# Patient Record
Sex: FEMALE | Race: WHITE | NOT HISPANIC OR LATINO | Employment: FULL TIME | URBAN - METROPOLITAN AREA
[De-identification: names, ages, dates, MRNs, and addresses within clinical notes are randomized per-mention and may not be internally consistent; named-entity substitution may affect disease eponyms.]

---
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
## 2021-02-09 ENCOUNTER — CLINICAL SUPPORT (OUTPATIENT)
Dept: URGENT CARE | Facility: CLINIC | Age: 21
End: 2021-02-09
Payer: COMMERCIAL

## 2021-02-09 DIAGNOSIS — Z11.9 ENCOUNTER FOR SCREENING EXAMINATION FOR INFECTIOUS DISEASE: Primary | ICD-10-CM

## 2021-02-09 LAB
CTP QC/QA: YES
SARS-COV-2 RDRP RESP QL NAA+PROBE: NEGATIVE

## 2021-02-09 PROCEDURE — U0002: ICD-10-PCS | Mod: QW,S$GLB,, | Performed by: INTERNAL MEDICINE

## 2021-02-09 PROCEDURE — U0002 COVID-19 LAB TEST NON-CDC: HCPCS | Mod: QW,S$GLB,, | Performed by: INTERNAL MEDICINE

## 2021-02-14 ENCOUNTER — CLINICAL SUPPORT (OUTPATIENT)
Dept: URGENT CARE | Facility: CLINIC | Age: 21
End: 2021-02-14
Payer: COMMERCIAL

## 2021-02-14 DIAGNOSIS — Z20.822 CLOSE EXPOSURE TO COVID-19 VIRUS: Primary | ICD-10-CM

## 2021-02-14 LAB
CTP QC/QA: YES
SARS-COV-2 RDRP RESP QL NAA+PROBE: NEGATIVE

## 2021-02-14 PROCEDURE — U0002: ICD-10-PCS | Mod: QW,S$GLB,, | Performed by: INTERNAL MEDICINE

## 2021-02-14 PROCEDURE — U0002 COVID-19 LAB TEST NON-CDC: HCPCS | Mod: QW,S$GLB,, | Performed by: INTERNAL MEDICINE

## 2021-03-02 ENCOUNTER — CLINICAL SUPPORT (OUTPATIENT)
Dept: URGENT CARE | Facility: CLINIC | Age: 21
End: 2021-03-02
Payer: COMMERCIAL

## 2021-03-02 DIAGNOSIS — U07.1 COVID-19 VIRUS DETECTED: ICD-10-CM

## 2021-03-02 DIAGNOSIS — Z11.9 ENCOUNTER FOR SCREENING EXAMINATION FOR INFECTIOUS DISEASE: Primary | ICD-10-CM

## 2021-03-02 LAB
CTP QC/QA: YES
SARS-COV-2 RDRP RESP QL NAA+PROBE: POSITIVE

## 2021-03-02 PROCEDURE — U0002: ICD-10-PCS | Mod: QW,S$GLB,, | Performed by: PHYSICIAN ASSISTANT

## 2021-03-02 PROCEDURE — U0002 COVID-19 LAB TEST NON-CDC: HCPCS | Mod: QW,S$GLB,, | Performed by: PHYSICIAN ASSISTANT

## 2021-04-15 ENCOUNTER — PATIENT MESSAGE (OUTPATIENT)
Dept: RESEARCH | Facility: HOSPITAL | Age: 21
End: 2021-04-15

## 2021-06-03 ENCOUNTER — IMMUNIZATION (OUTPATIENT)
Dept: PRIMARY CARE CLINIC | Facility: CLINIC | Age: 21
End: 2021-06-03
Payer: COMMERCIAL

## 2021-06-03 DIAGNOSIS — Z23 NEED FOR VACCINATION: Primary | ICD-10-CM

## 2021-06-03 PROCEDURE — 0001A COVID-19, MRNA, LNP-S, PF, 30 MCG/0.3 ML DOSE VACCINE: CPT | Mod: CV19,S$GLB,, | Performed by: INTERNAL MEDICINE

## 2021-06-03 PROCEDURE — 91300 COVID-19, MRNA, LNP-S, PF, 30 MCG/0.3 ML DOSE VACCINE: CPT | Mod: S$GLB,,, | Performed by: INTERNAL MEDICINE

## 2021-06-03 PROCEDURE — 0001A COVID-19, MRNA, LNP-S, PF, 30 MCG/0.3 ML DOSE VACCINE: ICD-10-PCS | Mod: CV19,S$GLB,, | Performed by: INTERNAL MEDICINE

## 2021-06-03 PROCEDURE — 91300 COVID-19, MRNA, LNP-S, PF, 30 MCG/0.3 ML DOSE VACCINE: ICD-10-PCS | Mod: S$GLB,,, | Performed by: INTERNAL MEDICINE

## 2021-06-18 RX ORDER — NORGESTIMATE AND ETHINYL ESTRADIOL 7DAYSX3 LO
1 KIT ORAL
COMMUNITY
Start: 2021-01-05 | End: 2023-03-30

## 2021-06-22 ENCOUNTER — OFFICE VISIT (OUTPATIENT)
Dept: FAMILY MEDICINE | Facility: CLINIC | Age: 21
End: 2021-06-22
Payer: COMMERCIAL

## 2021-06-22 VITALS
BODY MASS INDEX: 23.71 KG/M2 | SYSTOLIC BLOOD PRESSURE: 120 MMHG | HEART RATE: 68 BPM | TEMPERATURE: 98 F | HEIGHT: 64 IN | OXYGEN SATURATION: 99 % | DIASTOLIC BLOOD PRESSURE: 70 MMHG | WEIGHT: 138.88 LBS

## 2021-06-22 DIAGNOSIS — Z23 NEED FOR MENINGOCOCCAL VACCINATION: ICD-10-CM

## 2021-06-22 DIAGNOSIS — Z02.0 SCHOOL PHYSICAL EXAM: ICD-10-CM

## 2021-06-22 DIAGNOSIS — Z00.00 ANNUAL PHYSICAL EXAM: Primary | ICD-10-CM

## 2021-06-22 PROCEDURE — 3008F BODY MASS INDEX DOCD: CPT | Mod: CPTII,S$GLB,, | Performed by: FAMILY MEDICINE

## 2021-06-22 PROCEDURE — 99385 PR PREVENTIVE VISIT,NEW,18-39: ICD-10-PCS | Mod: S$GLB,,, | Performed by: FAMILY MEDICINE

## 2021-06-22 PROCEDURE — 99385 PREV VISIT NEW AGE 18-39: CPT | Mod: S$GLB,,, | Performed by: FAMILY MEDICINE

## 2021-06-22 PROCEDURE — 3008F PR BODY MASS INDEX (BMI) DOCUMENTED: ICD-10-PCS | Mod: CPTII,S$GLB,, | Performed by: FAMILY MEDICINE

## 2021-06-22 PROCEDURE — 86580 POCT TB SKIN TEST: ICD-10-PCS | Mod: S$GLB,,, | Performed by: FAMILY MEDICINE

## 2021-06-22 PROCEDURE — 1126F AMNT PAIN NOTED NONE PRSNT: CPT | Mod: S$GLB,,, | Performed by: FAMILY MEDICINE

## 2021-06-22 PROCEDURE — 99999 PR PBB SHADOW E&M-EST. PATIENT-LVL III: CPT | Mod: PBBFAC,,, | Performed by: FAMILY MEDICINE

## 2021-06-22 PROCEDURE — 99999 PR PBB SHADOW E&M-EST. PATIENT-LVL III: ICD-10-PCS | Mod: PBBFAC,,, | Performed by: FAMILY MEDICINE

## 2021-06-22 PROCEDURE — 86580 TB INTRADERMAL TEST: CPT | Mod: S$GLB,,, | Performed by: FAMILY MEDICINE

## 2021-06-22 PROCEDURE — 1126F PR PAIN SEVERITY QUANTIFIED, NO PAIN PRESENT: ICD-10-PCS | Mod: S$GLB,,, | Performed by: FAMILY MEDICINE

## 2021-06-24 ENCOUNTER — IMMUNIZATION (OUTPATIENT)
Dept: PRIMARY CARE CLINIC | Facility: CLINIC | Age: 21
End: 2021-06-24

## 2021-06-24 DIAGNOSIS — Z23 NEED FOR VACCINATION: Primary | ICD-10-CM

## 2021-06-24 PROCEDURE — 91300 COVID-19, MRNA, LNP-S, PF, 30 MCG/0.3 ML DOSE VACCINE: CPT | Mod: S$GLB,,, | Performed by: INTERNAL MEDICINE

## 2021-06-24 PROCEDURE — 0002A COVID-19, MRNA, LNP-S, PF, 30 MCG/0.3 ML DOSE VACCINE: CPT | Mod: CV19,S$GLB,, | Performed by: INTERNAL MEDICINE

## 2021-06-24 PROCEDURE — 0002A COVID-19, MRNA, LNP-S, PF, 30 MCG/0.3 ML DOSE VACCINE: ICD-10-PCS | Mod: CV19,S$GLB,, | Performed by: INTERNAL MEDICINE

## 2021-06-24 PROCEDURE — 91300 COVID-19, MRNA, LNP-S, PF, 30 MCG/0.3 ML DOSE VACCINE: ICD-10-PCS | Mod: S$GLB,,, | Performed by: INTERNAL MEDICINE

## 2021-06-25 ENCOUNTER — CLINICAL SUPPORT (OUTPATIENT)
Dept: FAMILY MEDICINE | Facility: CLINIC | Age: 21
End: 2021-06-25
Payer: COMMERCIAL

## 2021-06-25 ENCOUNTER — LAB VISIT (OUTPATIENT)
Dept: LAB | Facility: HOSPITAL | Age: 21
End: 2021-06-25
Attending: FAMILY MEDICINE
Payer: COMMERCIAL

## 2021-06-25 DIAGNOSIS — Z00.00 ANNUAL PHYSICAL EXAM: ICD-10-CM

## 2021-06-25 DIAGNOSIS — Z02.0 SCHOOL PHYSICAL EXAM: Primary | ICD-10-CM

## 2021-06-25 DIAGNOSIS — Z11.1 SCREENING-PULMONARY TB: Primary | ICD-10-CM

## 2021-06-25 DIAGNOSIS — Z02.0 SCHOOL PHYSICAL EXAM: ICD-10-CM

## 2021-06-25 LAB
ALBUMIN SERPL BCP-MCNC: 4.1 G/DL (ref 3.5–5.2)
ALP SERPL-CCNC: 46 U/L (ref 55–135)
ALT SERPL W/O P-5'-P-CCNC: 14 U/L (ref 10–44)
ANION GAP SERPL CALC-SCNC: 11 MMOL/L (ref 8–16)
AST SERPL-CCNC: 15 U/L (ref 10–40)
BASOPHILS # BLD AUTO: 0.04 K/UL (ref 0–0.2)
BASOPHILS NFR BLD: 0.4 % (ref 0–1.9)
BILIRUB SERPL-MCNC: 1.1 MG/DL (ref 0.1–1)
BUN SERPL-MCNC: 10 MG/DL (ref 6–20)
CALCIUM SERPL-MCNC: 9.6 MG/DL (ref 8.7–10.5)
CHLORIDE SERPL-SCNC: 104 MMOL/L (ref 95–110)
CHOLEST SERPL-MCNC: 145 MG/DL (ref 120–199)
CHOLEST/HDLC SERPL: 3 {RATIO} (ref 2–5)
CO2 SERPL-SCNC: 22 MMOL/L (ref 23–29)
CREAT SERPL-MCNC: 0.8 MG/DL (ref 0.5–1.4)
DIFFERENTIAL METHOD: ABNORMAL
EOSINOPHIL # BLD AUTO: 0.3 K/UL (ref 0–0.5)
EOSINOPHIL NFR BLD: 3.4 % (ref 0–8)
ERYTHROCYTE [DISTWIDTH] IN BLOOD BY AUTOMATED COUNT: 17 % (ref 11.5–14.5)
EST. GFR  (AFRICAN AMERICAN): >60 ML/MIN/1.73 M^2
EST. GFR  (NON AFRICAN AMERICAN): >60 ML/MIN/1.73 M^2
ESTIMATED AVG GLUCOSE: 82 MG/DL (ref 68–131)
GLUCOSE SERPL-MCNC: 76 MG/DL (ref 70–110)
HBA1C MFR BLD: 4.5 % (ref 4–5.6)
HCT VFR BLD AUTO: 33 % (ref 37–48.5)
HDLC SERPL-MCNC: 48 MG/DL (ref 40–75)
HDLC SERPL: 33.1 % (ref 20–50)
HGB BLD-MCNC: 9.9 G/DL (ref 12–16)
IMM GRANULOCYTES # BLD AUTO: 0.03 K/UL (ref 0–0.04)
IMM GRANULOCYTES NFR BLD AUTO: 0.3 % (ref 0–0.5)
LDLC SERPL CALC-MCNC: 76 MG/DL (ref 63–159)
LYMPHOCYTES # BLD AUTO: 1.2 K/UL (ref 1–4.8)
LYMPHOCYTES NFR BLD: 12.9 % (ref 18–48)
MCH RBC QN AUTO: 18.5 PG (ref 27–31)
MCHC RBC AUTO-ENTMCNC: 30 G/DL (ref 32–36)
MCV RBC AUTO: 62 FL (ref 82–98)
MONOCYTES # BLD AUTO: 0.5 K/UL (ref 0.3–1)
MONOCYTES NFR BLD: 5 % (ref 4–15)
NEUTROPHILS # BLD AUTO: 7.4 K/UL (ref 1.8–7.7)
NEUTROPHILS NFR BLD: 78 % (ref 38–73)
NONHDLC SERPL-MCNC: 97 MG/DL
NRBC BLD-RTO: 0 /100 WBC
PLATELET # BLD AUTO: 230 K/UL (ref 150–450)
PMV BLD AUTO: ABNORMAL FL (ref 9.2–12.9)
POTASSIUM SERPL-SCNC: 3.7 MMOL/L (ref 3.5–5.1)
PROT SERPL-MCNC: 7.4 G/DL (ref 6–8.4)
RBC # BLD AUTO: 5.34 M/UL (ref 4–5.4)
SODIUM SERPL-SCNC: 137 MMOL/L (ref 136–145)
TB INDURATION - 48 HR READ: 0 MM
TB INDURATION - 72 HR READ: NORMAL
TB SKIN TEST - 48 HR READ: NEGATIVE
TB SKIN TEST - 72 HR READ: NORMAL
TRIGL SERPL-MCNC: 105 MG/DL (ref 30–150)
TSH SERPL DL<=0.005 MIU/L-ACNC: 0.96 UIU/ML (ref 0.4–4)
WBC # BLD AUTO: 9.43 K/UL (ref 3.9–12.7)

## 2021-06-25 PROCEDURE — 85025 COMPLETE CBC W/AUTO DIFF WBC: CPT | Performed by: FAMILY MEDICINE

## 2021-06-25 PROCEDURE — 86706 HEP B SURFACE ANTIBODY: CPT | Performed by: FAMILY MEDICINE

## 2021-06-25 PROCEDURE — 86735 MUMPS ANTIBODY: CPT | Performed by: FAMILY MEDICINE

## 2021-06-25 PROCEDURE — 86762 RUBELLA ANTIBODY: CPT | Performed by: FAMILY MEDICINE

## 2021-06-25 PROCEDURE — 36415 COLL VENOUS BLD VENIPUNCTURE: CPT | Mod: PO | Performed by: FAMILY MEDICINE

## 2021-06-25 PROCEDURE — 86787 VARICELLA-ZOSTER ANTIBODY: CPT | Performed by: FAMILY MEDICINE

## 2021-06-25 PROCEDURE — 80053 COMPREHEN METABOLIC PANEL: CPT | Performed by: FAMILY MEDICINE

## 2021-06-25 PROCEDURE — 84443 ASSAY THYROID STIM HORMONE: CPT | Performed by: FAMILY MEDICINE

## 2021-06-25 PROCEDURE — 86803 HEPATITIS C AB TEST: CPT | Performed by: FAMILY MEDICINE

## 2021-06-25 PROCEDURE — 86703 HIV-1/HIV-2 1 RESULT ANTBDY: CPT | Performed by: FAMILY MEDICINE

## 2021-06-25 PROCEDURE — 86765 RUBEOLA ANTIBODY: CPT | Performed by: FAMILY MEDICINE

## 2021-06-25 PROCEDURE — 83036 HEMOGLOBIN GLYCOSYLATED A1C: CPT | Performed by: FAMILY MEDICINE

## 2021-06-25 PROCEDURE — 80061 LIPID PANEL: CPT | Performed by: FAMILY MEDICINE

## 2021-06-28 LAB
HBV SURFACE AB SER QL IA: NEGATIVE
HBV SURFACE AB SERPL IA-ACNC: <3 MIU/ML
HCV AB SERPL QL IA: NEGATIVE
HIV 1+2 AB+HIV1 P24 AG SERPL QL IA: NEGATIVE
MUMPS IGG INTERPRETATION: POSITIVE
MUMPS IGG SCREEN: 1.84 ISR (ref 0–0.9)
RUBEOLA IGG ANTIBODY: 1.16 ISR (ref 0–0.9)
RUBEOLA INTERPRETATION: POSITIVE
RUBV IGG SER-ACNC: 102 IU/ML
RUBV IGG SER-IMP: REACTIVE

## 2021-06-29 ENCOUNTER — TELEPHONE (OUTPATIENT)
Dept: FAMILY MEDICINE | Facility: CLINIC | Age: 21
End: 2021-06-29

## 2021-06-30 LAB
VARICELLA INTERPRETATION: ABNORMAL
VARICELLA ZOSTER IGG: 1 ISR (ref 0–0.9)

## 2021-07-01 DIAGNOSIS — Z23 NEED FOR HEPATITIS B VACCINATION: Primary | ICD-10-CM

## 2021-07-09 ENCOUNTER — CLINICAL SUPPORT (OUTPATIENT)
Dept: FAMILY MEDICINE | Facility: CLINIC | Age: 21
End: 2021-07-09
Payer: COMMERCIAL

## 2021-07-09 DIAGNOSIS — Z11.1 SCREENING-PULMONARY TB: Primary | ICD-10-CM

## 2021-07-09 PROCEDURE — 99499 NO LOS: ICD-10-PCS | Mod: S$GLB,,, | Performed by: FAMILY MEDICINE

## 2021-07-09 PROCEDURE — 96373 PR INJECTION,THERAP/PROPH/DIAGNOST, INTRA-ARTERIAL: ICD-10-PCS | Mod: S$GLB,,, | Performed by: FAMILY MEDICINE

## 2021-07-09 PROCEDURE — 96373 THER/PROPH/DIAG INJ IA: CPT | Mod: S$GLB,,, | Performed by: FAMILY MEDICINE

## 2021-07-09 PROCEDURE — 99499 UNLISTED E&M SERVICE: CPT | Mod: S$GLB,,, | Performed by: FAMILY MEDICINE

## 2021-07-09 PROCEDURE — 86580 POCT TB SKIN TEST: ICD-10-PCS | Mod: S$GLB,,, | Performed by: FAMILY MEDICINE

## 2021-07-09 PROCEDURE — 86580 TB INTRADERMAL TEST: CPT | Mod: S$GLB,,, | Performed by: FAMILY MEDICINE

## 2021-07-12 ENCOUNTER — CLINICAL SUPPORT (OUTPATIENT)
Dept: FAMILY MEDICINE | Facility: CLINIC | Age: 21
End: 2021-07-12
Payer: COMMERCIAL

## 2021-07-12 DIAGNOSIS — Z11.1 SCREENING-PULMONARY TB: Primary | ICD-10-CM

## 2021-07-12 LAB
TB INDURATION - 48 HR READ: 0 MM
TB INDURATION - 72 HR READ: NORMAL
TB SKIN TEST - 48 HR READ: NEGATIVE
TB SKIN TEST - 72 HR READ: NORMAL

## 2021-07-12 PROCEDURE — 99499 UNLISTED E&M SERVICE: CPT | Mod: S$GLB,,, | Performed by: FAMILY MEDICINE

## 2021-07-12 PROCEDURE — 99499 NO LOS: ICD-10-PCS | Mod: S$GLB,,, | Performed by: FAMILY MEDICINE

## 2021-08-05 ENCOUNTER — CLINICAL SUPPORT (OUTPATIENT)
Dept: FAMILY MEDICINE | Facility: CLINIC | Age: 21
End: 2021-08-05
Payer: COMMERCIAL

## 2021-08-05 DIAGNOSIS — Z23 NEED FOR HEPATITIS B VACCINATION: Primary | ICD-10-CM

## 2021-08-05 DIAGNOSIS — Z23 NEED FOR DIPHTHERIA-TETANUS-PERTUSSIS (TDAP) VACCINE: ICD-10-CM

## 2021-08-05 DIAGNOSIS — Z23 NEED FOR VARICELLA VACCINE: ICD-10-CM

## 2021-08-05 PROCEDURE — 90472 IMMUNIZATION ADMIN EACH ADD: CPT | Mod: S$GLB,,, | Performed by: FAMILY MEDICINE

## 2021-08-05 PROCEDURE — 90715 TDAP VACCINE 7 YRS/> IM: CPT | Mod: S$GLB,,, | Performed by: FAMILY MEDICINE

## 2021-08-05 PROCEDURE — 90472 VARICELLA VACCINE SQ: ICD-10-PCS | Mod: S$GLB,,, | Performed by: FAMILY MEDICINE

## 2021-08-05 PROCEDURE — 90716 VAR VACCINE LIVE SUBQ: CPT | Mod: S$GLB,,, | Performed by: FAMILY MEDICINE

## 2021-08-05 PROCEDURE — 99499 NO LOS: ICD-10-PCS | Mod: S$GLB,,, | Performed by: FAMILY MEDICINE

## 2021-08-05 PROCEDURE — 90471 HEPATITIS B (RECOMBINANT) ADJUVANTED, 2 DOSE: ICD-10-PCS | Mod: S$GLB,,, | Performed by: FAMILY MEDICINE

## 2021-08-05 PROCEDURE — 90715 TDAP VACCINE GREATER THAN OR EQUAL TO 7YO IM: ICD-10-PCS | Mod: S$GLB,,, | Performed by: FAMILY MEDICINE

## 2021-08-05 PROCEDURE — 90739 HEPB VACC 2/4 DOSE ADULT IM: CPT | Mod: S$GLB,,, | Performed by: FAMILY MEDICINE

## 2021-08-05 PROCEDURE — 90739 HEPATITIS B (RECOMBINANT) ADJUVANTED, 2 DOSE: ICD-10-PCS | Mod: S$GLB,,, | Performed by: FAMILY MEDICINE

## 2021-08-05 PROCEDURE — 90471 IMMUNIZATION ADMIN: CPT | Mod: S$GLB,,, | Performed by: FAMILY MEDICINE

## 2021-08-05 PROCEDURE — 90716 VARICELLA VACCINE SQ: ICD-10-PCS | Mod: S$GLB,,, | Performed by: FAMILY MEDICINE

## 2021-08-05 PROCEDURE — 99499 UNLISTED E&M SERVICE: CPT | Mod: S$GLB,,, | Performed by: FAMILY MEDICINE

## 2021-09-21 ENCOUNTER — TELEPHONE (OUTPATIENT)
Dept: FAMILY MEDICINE | Facility: CLINIC | Age: 21
End: 2021-09-21

## 2021-09-21 DIAGNOSIS — Z02.0 SCHOOL PHYSICAL EXAM: Primary | ICD-10-CM

## 2021-09-22 ENCOUNTER — TELEPHONE (OUTPATIENT)
Dept: FAMILY MEDICINE | Facility: CLINIC | Age: 21
End: 2021-09-22

## 2021-09-23 ENCOUNTER — LAB VISIT (OUTPATIENT)
Dept: LAB | Facility: HOSPITAL | Age: 21
End: 2021-09-23
Attending: FAMILY MEDICINE
Payer: COMMERCIAL

## 2021-09-23 DIAGNOSIS — Z02.0 SCHOOL PHYSICAL EXAM: ICD-10-CM

## 2021-09-23 PROCEDURE — 86735 MUMPS ANTIBODY: CPT | Performed by: FAMILY MEDICINE

## 2021-09-23 PROCEDURE — 86765 RUBEOLA ANTIBODY: CPT | Performed by: FAMILY MEDICINE

## 2021-09-23 PROCEDURE — 86706 HEP B SURFACE ANTIBODY: CPT | Performed by: FAMILY MEDICINE

## 2021-09-23 PROCEDURE — 86787 VARICELLA-ZOSTER ANTIBODY: CPT | Performed by: FAMILY MEDICINE

## 2021-09-23 PROCEDURE — 86762 RUBELLA ANTIBODY: CPT | Performed by: FAMILY MEDICINE

## 2021-09-25 LAB
MUMPS IGG INTERPRETATION: POSITIVE
MUMPS IGG SCREEN: 2.32 ISR (ref 0–0.9)
VARICELLA INTERPRETATION: POSITIVE
VARICELLA ZOSTER IGG: 2.31 ISR (ref 0–0.9)

## 2021-09-27 LAB
RUBEOLA IGG ANTIBODY: 0.91 ISR (ref 0–0.9)
RUBEOLA INTERPRETATION: ABNORMAL
RUBV IGG SER-ACNC: 111 IU/ML
RUBV IGG SER-IMP: REACTIVE

## 2021-09-28 LAB
HBV SURFACE AB SER QL IA: POSITIVE
HBV SURFACE AB SERPL IA-ACNC: 401 MIU/ML

## 2021-10-15 ENCOUNTER — OFFICE VISIT (OUTPATIENT)
Dept: FAMILY MEDICINE | Facility: CLINIC | Age: 21
End: 2021-10-15
Payer: COMMERCIAL

## 2021-10-15 VITALS
RESPIRATION RATE: 18 BRPM | OXYGEN SATURATION: 99 % | HEART RATE: 87 BPM | HEIGHT: 64 IN | BODY MASS INDEX: 24.1 KG/M2 | WEIGHT: 141.13 LBS | SYSTOLIC BLOOD PRESSURE: 120 MMHG | TEMPERATURE: 98 F | DIASTOLIC BLOOD PRESSURE: 80 MMHG

## 2021-10-15 DIAGNOSIS — R41.840 INATTENTION: Primary | ICD-10-CM

## 2021-10-15 PROCEDURE — 99999 PR PBB SHADOW E&M-EST. PATIENT-LVL IV: CPT | Mod: PBBFAC,,, | Performed by: NURSE PRACTITIONER

## 2021-10-15 PROCEDURE — 3074F SYST BP LT 130 MM HG: CPT | Mod: CPTII,S$GLB,, | Performed by: NURSE PRACTITIONER

## 2021-10-15 PROCEDURE — 99999 PR PBB SHADOW E&M-EST. PATIENT-LVL IV: ICD-10-PCS | Mod: PBBFAC,,, | Performed by: NURSE PRACTITIONER

## 2021-10-15 PROCEDURE — 1159F MED LIST DOCD IN RCRD: CPT | Mod: CPTII,S$GLB,, | Performed by: NURSE PRACTITIONER

## 2021-10-15 PROCEDURE — 1159F PR MEDICATION LIST DOCUMENTED IN MEDICAL RECORD: ICD-10-PCS | Mod: CPTII,S$GLB,, | Performed by: NURSE PRACTITIONER

## 2021-10-15 PROCEDURE — 3008F BODY MASS INDEX DOCD: CPT | Mod: CPTII,S$GLB,, | Performed by: NURSE PRACTITIONER

## 2021-10-15 PROCEDURE — 3079F PR MOST RECENT DIASTOLIC BLOOD PRESSURE 80-89 MM HG: ICD-10-PCS | Mod: CPTII,S$GLB,, | Performed by: NURSE PRACTITIONER

## 2021-10-15 PROCEDURE — 99212 OFFICE O/P EST SF 10 MIN: CPT | Mod: S$GLB,,, | Performed by: NURSE PRACTITIONER

## 2021-10-15 PROCEDURE — 3044F PR MOST RECENT HEMOGLOBIN A1C LEVEL <7.0%: ICD-10-PCS | Mod: CPTII,S$GLB,, | Performed by: NURSE PRACTITIONER

## 2021-10-15 PROCEDURE — 3044F HG A1C LEVEL LT 7.0%: CPT | Mod: CPTII,S$GLB,, | Performed by: NURSE PRACTITIONER

## 2021-10-15 PROCEDURE — 3008F PR BODY MASS INDEX (BMI) DOCUMENTED: ICD-10-PCS | Mod: CPTII,S$GLB,, | Performed by: NURSE PRACTITIONER

## 2021-10-15 PROCEDURE — 99212 PR OFFICE/OUTPT VISIT, EST, LEVL II, 10-19 MIN: ICD-10-PCS | Mod: S$GLB,,, | Performed by: NURSE PRACTITIONER

## 2021-10-15 PROCEDURE — 3079F DIAST BP 80-89 MM HG: CPT | Mod: CPTII,S$GLB,, | Performed by: NURSE PRACTITIONER

## 2021-10-15 PROCEDURE — 3074F PR MOST RECENT SYSTOLIC BLOOD PRESSURE < 130 MM HG: ICD-10-PCS | Mod: CPTII,S$GLB,, | Performed by: NURSE PRACTITIONER

## 2021-11-03 ENCOUNTER — PATIENT MESSAGE (OUTPATIENT)
Dept: PSYCHIATRY | Facility: CLINIC | Age: 21
End: 2021-11-03
Payer: COMMERCIAL

## 2021-11-03 ENCOUNTER — OFFICE VISIT (OUTPATIENT)
Dept: PSYCHIATRY | Facility: CLINIC | Age: 21
End: 2021-11-03
Payer: COMMERCIAL

## 2021-11-03 VITALS
WEIGHT: 139.69 LBS | BODY MASS INDEX: 23.85 KG/M2 | SYSTOLIC BLOOD PRESSURE: 129 MMHG | HEART RATE: 86 BPM | DIASTOLIC BLOOD PRESSURE: 76 MMHG | HEIGHT: 64 IN

## 2021-11-03 DIAGNOSIS — F90.2 ATTENTION DEFICIT HYPERACTIVITY DISORDER (ADHD), COMBINED TYPE: Primary | ICD-10-CM

## 2021-11-03 LAB
AMPHET+METHAMPHET UR QL: NEGATIVE
BARBITURATES UR QL SCN>200 NG/ML: NEGATIVE
BENZODIAZ UR QL SCN>200 NG/ML: NEGATIVE
BZE UR QL SCN: NEGATIVE
CANNABINOIDS UR QL SCN: NEGATIVE
CREAT UR-MCNC: 92 MG/DL (ref 15–325)
ETHANOL UR-MCNC: <10 MG/DL
METHADONE UR QL SCN>300 NG/ML: NEGATIVE
OPIATES UR QL SCN: NEGATIVE
PCP UR QL SCN>25 NG/ML: NEGATIVE
TOXICOLOGY INFORMATION: NORMAL

## 2021-11-03 PROCEDURE — 1160F RVW MEDS BY RX/DR IN RCRD: CPT | Mod: CPTII,S$GLB,, | Performed by: NURSE PRACTITIONER

## 2021-11-03 PROCEDURE — 3008F BODY MASS INDEX DOCD: CPT | Mod: CPTII,S$GLB,, | Performed by: NURSE PRACTITIONER

## 2021-11-03 PROCEDURE — 3074F PR MOST RECENT SYSTOLIC BLOOD PRESSURE < 130 MM HG: ICD-10-PCS | Mod: CPTII,S$GLB,, | Performed by: NURSE PRACTITIONER

## 2021-11-03 PROCEDURE — 3074F SYST BP LT 130 MM HG: CPT | Mod: CPTII,S$GLB,, | Performed by: NURSE PRACTITIONER

## 2021-11-03 PROCEDURE — 1159F MED LIST DOCD IN RCRD: CPT | Mod: CPTII,S$GLB,, | Performed by: NURSE PRACTITIONER

## 2021-11-03 PROCEDURE — 3078F DIAST BP <80 MM HG: CPT | Mod: CPTII,S$GLB,, | Performed by: NURSE PRACTITIONER

## 2021-11-03 PROCEDURE — 1159F PR MEDICATION LIST DOCUMENTED IN MEDICAL RECORD: ICD-10-PCS | Mod: CPTII,S$GLB,, | Performed by: NURSE PRACTITIONER

## 2021-11-03 PROCEDURE — 90792 PR PSYCHIATRIC DIAGNOSTIC EVALUATION W/MEDICAL SERVICES: ICD-10-PCS | Mod: S$GLB,,, | Performed by: NURSE PRACTITIONER

## 2021-11-03 PROCEDURE — 3008F PR BODY MASS INDEX (BMI) DOCUMENTED: ICD-10-PCS | Mod: CPTII,S$GLB,, | Performed by: NURSE PRACTITIONER

## 2021-11-03 PROCEDURE — 1160F PR REVIEW ALL MEDS BY PRESCRIBER/CLIN PHARMACIST DOCUMENTED: ICD-10-PCS | Mod: CPTII,S$GLB,, | Performed by: NURSE PRACTITIONER

## 2021-11-03 PROCEDURE — 90792 PSYCH DIAG EVAL W/MED SRVCS: CPT | Mod: S$GLB,,, | Performed by: NURSE PRACTITIONER

## 2021-11-03 PROCEDURE — 80307 DRUG TEST PRSMV CHEM ANLYZR: CPT | Performed by: NURSE PRACTITIONER

## 2021-11-03 PROCEDURE — 99999 PR PBB SHADOW E&M-EST. PATIENT-LVL III: CPT | Mod: PBBFAC,,, | Performed by: NURSE PRACTITIONER

## 2021-11-03 PROCEDURE — 99999 PR PBB SHADOW E&M-EST. PATIENT-LVL III: ICD-10-PCS | Mod: PBBFAC,,, | Performed by: NURSE PRACTITIONER

## 2021-11-03 PROCEDURE — 3078F PR MOST RECENT DIASTOLIC BLOOD PRESSURE < 80 MM HG: ICD-10-PCS | Mod: CPTII,S$GLB,, | Performed by: NURSE PRACTITIONER

## 2021-11-03 PROCEDURE — 3044F PR MOST RECENT HEMOGLOBIN A1C LEVEL <7.0%: ICD-10-PCS | Mod: CPTII,S$GLB,, | Performed by: NURSE PRACTITIONER

## 2021-11-03 PROCEDURE — 3044F HG A1C LEVEL LT 7.0%: CPT | Mod: CPTII,S$GLB,, | Performed by: NURSE PRACTITIONER

## 2021-11-03 RX ORDER — LISDEXAMFETAMINE DIMESYLATE 10 MG/1
10 CAPSULE ORAL DAILY PRN
Qty: 30 CAPSULE | Refills: 0 | Status: SHIPPED | OUTPATIENT
Start: 2021-11-03 | End: 2021-11-04 | Stop reason: SDUPTHER

## 2021-11-04 RX ORDER — LISDEXAMFETAMINE DIMESYLATE 10 MG/1
10 CAPSULE ORAL DAILY PRN
Qty: 30 CAPSULE | Refills: 0 | Status: SHIPPED | OUTPATIENT
Start: 2021-11-04 | End: 2021-11-22 | Stop reason: DRUGHIGH

## 2021-11-22 ENCOUNTER — OFFICE VISIT (OUTPATIENT)
Dept: PSYCHIATRY | Facility: CLINIC | Age: 21
End: 2021-11-22
Payer: COMMERCIAL

## 2021-11-22 ENCOUNTER — PATIENT MESSAGE (OUTPATIENT)
Dept: PSYCHIATRY | Facility: CLINIC | Age: 21
End: 2021-11-22
Payer: COMMERCIAL

## 2021-11-22 DIAGNOSIS — F90.2 ATTENTION DEFICIT HYPERACTIVITY DISORDER (ADHD), COMBINED TYPE: Primary | ICD-10-CM

## 2021-11-22 PROCEDURE — 99214 PR OFFICE/OUTPT VISIT, EST, LEVL IV, 30-39 MIN: ICD-10-PCS | Mod: 95,,, | Performed by: NURSE PRACTITIONER

## 2021-11-22 PROCEDURE — 99214 OFFICE O/P EST MOD 30 MIN: CPT | Mod: 95,,, | Performed by: NURSE PRACTITIONER

## 2021-11-22 RX ORDER — LISDEXAMFETAMINE DIMESYLATE 30 MG/1
30 CAPSULE ORAL EVERY MORNING
Qty: 30 CAPSULE | Refills: 0 | Status: SHIPPED | OUTPATIENT
Start: 2021-11-22 | End: 2021-12-10 | Stop reason: SDUPTHER

## 2021-12-10 ENCOUNTER — OFFICE VISIT (OUTPATIENT)
Dept: PSYCHIATRY | Facility: CLINIC | Age: 21
End: 2021-12-10
Payer: COMMERCIAL

## 2021-12-10 VITALS
WEIGHT: 135.56 LBS | SYSTOLIC BLOOD PRESSURE: 122 MMHG | HEART RATE: 88 BPM | BODY MASS INDEX: 23.27 KG/M2 | DIASTOLIC BLOOD PRESSURE: 84 MMHG

## 2021-12-10 DIAGNOSIS — F90.2 ATTENTION DEFICIT HYPERACTIVITY DISORDER (ADHD), COMBINED TYPE: ICD-10-CM

## 2021-12-10 PROCEDURE — 99999 PR PBB SHADOW E&M-EST. PATIENT-LVL II: ICD-10-PCS | Mod: PBBFAC,,, | Performed by: NURSE PRACTITIONER

## 2021-12-10 PROCEDURE — 99213 PR OFFICE/OUTPT VISIT, EST, LEVL III, 20-29 MIN: ICD-10-PCS | Mod: S$GLB,,, | Performed by: NURSE PRACTITIONER

## 2021-12-10 PROCEDURE — 99213 OFFICE O/P EST LOW 20 MIN: CPT | Mod: S$GLB,,, | Performed by: NURSE PRACTITIONER

## 2021-12-10 PROCEDURE — 99999 PR PBB SHADOW E&M-EST. PATIENT-LVL II: CPT | Mod: PBBFAC,,, | Performed by: NURSE PRACTITIONER

## 2021-12-10 RX ORDER — LISDEXAMFETAMINE DIMESYLATE 30 MG/1
30 CAPSULE ORAL EVERY MORNING
Qty: 30 CAPSULE | Refills: 0 | Status: SHIPPED | OUTPATIENT
Start: 2021-12-22 | End: 2022-01-21

## 2021-12-10 RX ORDER — LISDEXAMFETAMINE DIMESYLATE 30 MG/1
30 CAPSULE ORAL EVERY MORNING
Qty: 30 CAPSULE | Refills: 0 | Status: SHIPPED | OUTPATIENT
Start: 2022-01-22 | End: 2022-02-04 | Stop reason: SDUPTHER

## 2021-12-10 RX ORDER — LISDEXAMFETAMINE DIMESYLATE 30 MG/1
30 CAPSULE ORAL EVERY MORNING
Qty: 30 CAPSULE | Refills: 0 | Status: SHIPPED | OUTPATIENT
Start: 2022-02-22 | End: 2022-04-05 | Stop reason: SDUPTHER

## 2022-02-03 ENCOUNTER — PATIENT MESSAGE (OUTPATIENT)
Dept: PSYCHIATRY | Facility: CLINIC | Age: 22
End: 2022-02-03
Payer: COMMERCIAL

## 2022-02-04 DIAGNOSIS — F90.2 ATTENTION DEFICIT HYPERACTIVITY DISORDER (ADHD), COMBINED TYPE: ICD-10-CM

## 2022-02-04 RX ORDER — LISDEXAMFETAMINE DIMESYLATE 30 MG/1
30 CAPSULE ORAL EVERY MORNING
Qty: 30 CAPSULE | Refills: 0 | Status: SHIPPED | OUTPATIENT
Start: 2022-02-04 | End: 2022-03-06

## 2022-04-05 ENCOUNTER — OFFICE VISIT (OUTPATIENT)
Dept: PSYCHIATRY | Facility: CLINIC | Age: 22
End: 2022-04-05
Payer: COMMERCIAL

## 2022-04-05 DIAGNOSIS — F90.2 ATTENTION DEFICIT HYPERACTIVITY DISORDER (ADHD), COMBINED TYPE: Primary | ICD-10-CM

## 2022-04-05 PROCEDURE — 1160F PR REVIEW ALL MEDS BY PRESCRIBER/CLIN PHARMACIST DOCUMENTED: ICD-10-PCS | Mod: CPTII,95,, | Performed by: NURSE PRACTITIONER

## 2022-04-05 PROCEDURE — 1159F MED LIST DOCD IN RCRD: CPT | Mod: CPTII,95,, | Performed by: NURSE PRACTITIONER

## 2022-04-05 PROCEDURE — 99214 PR OFFICE/OUTPT VISIT, EST, LEVL IV, 30-39 MIN: ICD-10-PCS | Mod: 95,,, | Performed by: NURSE PRACTITIONER

## 2022-04-05 PROCEDURE — 1159F PR MEDICATION LIST DOCUMENTED IN MEDICAL RECORD: ICD-10-PCS | Mod: CPTII,95,, | Performed by: NURSE PRACTITIONER

## 2022-04-05 PROCEDURE — 1160F RVW MEDS BY RX/DR IN RCRD: CPT | Mod: CPTII,95,, | Performed by: NURSE PRACTITIONER

## 2022-04-05 PROCEDURE — 99214 OFFICE O/P EST MOD 30 MIN: CPT | Mod: 95,,, | Performed by: NURSE PRACTITIONER

## 2022-04-05 RX ORDER — LISDEXAMFETAMINE DIMESYLATE 30 MG/1
30 CAPSULE ORAL EVERY MORNING
Qty: 30 CAPSULE | Refills: 0 | Status: SHIPPED | OUTPATIENT
Start: 2022-04-05 | End: 2022-04-27 | Stop reason: SDUPTHER

## 2022-04-05 RX ORDER — DEXTROAMPHETAMINE SACCHARATE, AMPHETAMINE ASPARTATE, DEXTROAMPHETAMINE SULFATE AND AMPHETAMINE SULFATE 1.25; 1.25; 1.25; 1.25 MG/1; MG/1; MG/1; MG/1
TABLET ORAL
Qty: 15 TABLET | Refills: 0 | Status: SHIPPED | OUTPATIENT
Start: 2022-04-05 | End: 2022-08-11

## 2022-04-05 NOTE — PROGRESS NOTES
4/5/2022 12:56 PM  Erika Rodriguez  2000  [id number]    Outpatient Psychiatry Follow-Up Visit (MD/NP) - Telemedicine Visit      The patient location is: Patient reported that her location at the time of this visit was in the [facility]         Visit type: audiovisual      Each patient to whom he or she provides medical services by telemedicine is:  (1) informed of the relationship between the physician and patient and the respective role of any other health care provider with respect to management of the patient; and (2) notified that he or she may decline to receive medical services by telemedicine and may withdraw from such care at any time.        Chief Complaint:  Erika Rodriguez, a 21 y.o. female,who presents today for follow up of ADHD.  Met with patient.      Interval History/Subjective Report/Content of Current Session:     Pt is a 21 y.o. female with a hx of ADHD.    Today the pt reports that she is happy with Vyvnase 30 mg. It is adequately controlling her sxs of inattention and lack of focus. However, she notes that two days a week, she needs the med to last longer than 4 pm because she has to study. She tried taking the med later in the am, but that did not help. She only takes the med when she is in school. 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   Sleeping well. Mood is good.    Pt denies tremor, tic, insomnia, CP and heart palpitations. Does notes a slight decrease in appetite and xerostomia but reports these are tolerable.    Most recent VS were reviewed. Pt does take drug holidays from the stimulant medication. Takes the medication Mon-Fri while she is in classes and clinicals. Pt denies recurrent thoughts of death and denies SI/HI. Denies any sxs of lexie. Denies AVH, paranoia and delusions. No objective s/sx of psychosis or lexie.     Discussed risks, benefits and SE profile of medication options. Discussed increasing dose of Vyvanse vs adding Adderall  IR.      Psychotherapy:  · Target symptoms: distractability, lack of focus  · Why chosen therapy is appropriate versus another modality: relevant to diagnosis, patient responds to this modality  · Outcome monitoring methods: self-report, observation  · Therapeutic intervention type: supportive psychotherapy  · Topics discussed/themes: school stress, building skills sets for symptom management  · The patient's response to the intervention is accepting. The patient's progress toward treatment goals is good.   · Duration of intervention: 5 minutes.        Psychotropic medication review  Previous Trials-  None    Current meds-  Vyvanse        Review of Systems       Review of Systems   Constitutional: Negative for chills, fever and malaise/fatigue.   Respiratory: Negative for cough and shortness of breath.    Cardiovascular: Negative for chest pain and palpitations.   Gastrointestinal: Negative for abdominal pain, diarrhea and vomiting.   Musculoskeletal: Negative for falls and myalgias.   Skin: Negative for rash.   Neurological: Negative for tremors, seizures and headaches.   Psychiatric/Behavioral:        See HPI         Past Medical, Family and Social History: The patient's past medical, family and social history, allergies, current medications, past surgical history, and problem list have been reviewed and updated as appropriate within the electronic medical record.    Compliance: yes    Side effects: None    Risk Parameters:  Patient reports no suicidal ideation  Patient reports no homicidal ideation  Patient reports no self-injurious behavior  Patient reports no violent behavior    Exam (detailed: at least 9 elements; comprehensive: all 15 elements)   Constitutional  Vitals:  Most recent vital signs, dated less than 90 days prior to this appointment, were reviewed.   There were no vitals filed for this visit.     General:  unremarkable, age appropriate, normal weight, well nourished, casually dressed, neatly  groomed     Musculoskeletal  Muscle Strength/Tone:  not examined - STEVIE due to virtual visit   Gait & Station:  STEVIE due to virtual visit     Psychiatric      Appearance:  unremarkable, age appropriate, normal weight, well nourished, casually dressed, neatly groomed   Behavior:  normal, friendly and cooperative, eye contact normal     Speech:  no latency; no press   Mood & Affect:  euthymic  congruent and appropriate   Thought Process:  normal and logical   Associations:  intact   Thought Content:  normal, no suicidality, no homicidality, delusions, or paranoia   Insight:  intact, has awareness of illness   Judgement: behavior is adequate to circumstances, age appropriate   Orientation:  grossly intact   Memory: intact for content of interview   Language: grossly intact   Attention Span & Concentration:  able to focus   Fund of Knowledge:  intact and appropriate to age and level of education     Medications:  Outpatient Encounter Medications as of 4/5/2022   Medication Sig Dispense Refill    lisdexamfetamine (VYVANSE) 30 MG capsule Take 1 capsule (30 mg total) by mouth every morning. 30 capsule 0    norgestimate-ethinyl estradioL (ORTHO TRI-CYCLEN LO) 0.18/0.215/0.25 mg-25 mcg tablet Take 1 tablet by mouth.       No facility-administered encounter medications on file as of 4/5/2022.       Allergy:  Review of patient's allergies indicates:  No Known Allergies      Assessment and Diagnosis   Status/Progress: Based on the examination today, the patient's problem(s) is/are well controlled.  New problems have not been presented today.   Co-morbidities are not complicating management of the primary condition.  There are no active rule-out diagnoses for this patient at this time.         General Impression:       ICD-10-CM ICD-9-CM   1. Attention deficit hyperactivity disorder (ADHD), combined type  F90.2 314.01       Intervention/Counseling/Treatment Plan     · Medication Management:  · Continue Vyvanse 30 mg q  am  · Start Adderall IR 5 mg q day at 1 pm prn inattention. #15 x 1 month. Pt will message me via the pt portal in 2-3 weeks to provide an update.  · Labs: reviewed most recent  Stimulant: Discussed the risks of stimulants including addiction, tolerance, and possible withdrawal associated with stimulant use. Possible side effects including heart palpitations, restlessness, increased anxiety, decreased appetite, insomnia, and dry mouth were also discussed with the patient. Patient advised not to mix the medication with alcohol, and pt verbalized understanding.  Discussed guidelines for prescribing controled substances. (no early refills, no replacement of lost prescription, random drug testing and no fills if UDS is positive for drugs, regular follow up,etc).  Prescription Monitoring Program queried.  Discussed informed consent, diagnosis, risks and benefits of proposed treatment vs alternative treatments vs no treatment, and potential side effects of these treatments (death, dependency, psychosis, lexie, aggression, HTN, ticks, MI, stroke, arrythmia, seizure, anaphylaxis or other allergic reactions, leukopenia, nervousness, anorexia, insomnia, tachycardia, palpitations, dizziness, BP changes, HR changes, visual disturbance, etc.). The risks and benefits of medication were discussed with the patient. The patient expresses understanding of the above and displays the capacity to agree with this treatment given said understanding. Patient also agrees that, currently, the benefits outweigh the risks and would like to pursue treatment at this time.  · Encouraged pt to eat a healthful diet and to get regular physical exercise (20-30 minutes/day).  · The treatment plan and follow up plan were reviewed with the patient.  · Discussed with patient informed consent, risks vs. benefits, alternative treatments, side effect profile and the inherent unpredictability of individual responses to these treatments. The patient expresses  understanding of the above and displays the capacity to agree with this current plan and had no other questions.  · Encouraged Patient to keep future appointments.   · Take medications as prescribed and abstain from substance abuse.   · Pt was told to present to ED or call 911 for SI/HI plan or intent, psychosis, or other psychiatric or medical emergency, and pt verbalized understanding.      Return to Clinic: 3 months, or sooner if needed      Face-to-face time with patient:  24 minutes  Total time:  34 minutes of total time spent on the encounter, which includes face to face time and non-face to face time preparing to see the patient (eg, review of tests), Obtaining and/or reviewing separately obtained history, Documenting clinical information in the electronic or other health record, Independently interpreting results (not separately reported) and communicating results to the patient/family/caregiver, or Care coordination (not separately reported).       Gail Farias, MSN, APRN, PMHNP-BC [facility]

## 2022-04-26 ENCOUNTER — PATIENT MESSAGE (OUTPATIENT)
Dept: PSYCHIATRY | Facility: CLINIC | Age: 22
End: 2022-04-26
Payer: COMMERCIAL

## 2022-04-27 DIAGNOSIS — F90.2 ATTENTION DEFICIT HYPERACTIVITY DISORDER (ADHD), COMBINED TYPE: ICD-10-CM

## 2022-04-27 RX ORDER — LISDEXAMFETAMINE DIMESYLATE 30 MG/1
30 CAPSULE ORAL EVERY MORNING
Qty: 30 CAPSULE | Refills: 0 | Status: SHIPPED | OUTPATIENT
Start: 2022-05-05 | End: 2022-06-04

## 2022-04-27 RX ORDER — LISDEXAMFETAMINE DIMESYLATE 30 MG/1
30 CAPSULE ORAL EVERY MORNING
Qty: 30 CAPSULE | Refills: 0 | Status: SHIPPED | OUTPATIENT
Start: 2022-06-05 | End: 2022-07-21

## 2022-07-18 ENCOUNTER — TELEPHONE (OUTPATIENT)
Dept: FAMILY MEDICINE | Facility: CLINIC | Age: 22
End: 2022-07-18
Payer: COMMERCIAL

## 2022-07-18 NOTE — TELEPHONE ENCOUNTER
Below information given to patient, verbalized   understanding. Patient states she will schedule the appointment on her own

## 2022-07-18 NOTE — TELEPHONE ENCOUNTER
I have not seen patient in over 1 year.   She would need an appointment  I can also not order any testing done at [facility]

## 2022-07-18 NOTE — TELEPHONE ENCOUNTER
----- Message from Melyssa Saeed sent at 7/18/2022  1:16 PM CDT -----  Regarding: request for an order for a TB skin test  Type: Patient Call Back    Who called:Erika     What is the request in detail: the patient called to request a new order for a TB blood test. It is called quantum feron gold blood test. The patient would like the order faxed over to Chris at : [phone]. The patient would like the order sent over today before 4pm if possible. She can be reached at the number below.    Can the clinic reply by [facility]?no    Would the patient rather a call back or a response via [facility]? Call back     Best call back number:[phone]

## 2022-07-21 ENCOUNTER — LAB VISIT (OUTPATIENT)
Dept: LAB | Facility: HOSPITAL | Age: 22
End: 2022-07-21
Attending: NURSE PRACTITIONER
Payer: COMMERCIAL

## 2022-07-21 ENCOUNTER — OFFICE VISIT (OUTPATIENT)
Dept: FAMILY MEDICINE | Facility: CLINIC | Age: 22
End: 2022-07-21
Payer: COMMERCIAL

## 2022-07-21 VITALS
WEIGHT: 124.44 LBS | DIASTOLIC BLOOD PRESSURE: 60 MMHG | TEMPERATURE: 99 F | OXYGEN SATURATION: 99 % | HEART RATE: 106 BPM | BODY MASS INDEX: 21.36 KG/M2 | SYSTOLIC BLOOD PRESSURE: 110 MMHG

## 2022-07-21 DIAGNOSIS — D64.9 LOW HEMOGLOBIN AND LOW HEMATOCRIT: ICD-10-CM

## 2022-07-21 DIAGNOSIS — Z11.1 SCREENING-PULMONARY TB: ICD-10-CM

## 2022-07-21 DIAGNOSIS — Z11.1 SCREENING-PULMONARY TB: Primary | ICD-10-CM

## 2022-07-21 DIAGNOSIS — R41.840 INATTENTION: ICD-10-CM

## 2022-07-21 LAB
BASOPHILS # BLD AUTO: 0.06 K/UL (ref 0–0.2)
BASOPHILS NFR BLD: 0.8 % (ref 0–1.9)
DIFFERENTIAL METHOD: ABNORMAL
EOSINOPHIL # BLD AUTO: 0.4 K/UL (ref 0–0.5)
EOSINOPHIL NFR BLD: 5.1 % (ref 0–8)
ERYTHROCYTE [DISTWIDTH] IN BLOOD BY AUTOMATED COUNT: 16.3 % (ref 11.5–14.5)
FERRITIN SERPL-MCNC: 53 NG/ML (ref 20–300)
HCT VFR BLD AUTO: 31.7 % (ref 37–48.5)
HGB BLD-MCNC: 10 G/DL (ref 12–16)
IMM GRANULOCYTES # BLD AUTO: 0.03 K/UL (ref 0–0.04)
IMM GRANULOCYTES NFR BLD AUTO: 0.4 % (ref 0–0.5)
IRON SERPL-MCNC: 121 UG/DL (ref 30–160)
LYMPHOCYTES # BLD AUTO: 1.9 K/UL (ref 1–4.8)
LYMPHOCYTES NFR BLD: 24.8 % (ref 18–48)
MCH RBC QN AUTO: 19.2 PG (ref 27–31)
MCHC RBC AUTO-ENTMCNC: 31.5 G/DL (ref 32–36)
MCV RBC AUTO: 61 FL (ref 82–98)
MONOCYTES # BLD AUTO: 0.5 K/UL (ref 0.3–1)
MONOCYTES NFR BLD: 6.3 % (ref 4–15)
NEUTROPHILS # BLD AUTO: 4.7 K/UL (ref 1.8–7.7)
NEUTROPHILS NFR BLD: 62.6 % (ref 38–73)
NRBC BLD-RTO: 0 /100 WBC
PLATELET # BLD AUTO: 252 K/UL (ref 150–450)
PMV BLD AUTO: 11.7 FL (ref 9.2–12.9)
RBC # BLD AUTO: 5.2 M/UL (ref 4–5.4)
SATURATED IRON: 31 % (ref 20–50)
TOTAL IRON BINDING CAPACITY: 386 UG/DL (ref 250–450)
TRANSFERRIN SERPL-MCNC: 261 MG/DL (ref 200–375)
WBC # BLD AUTO: 7.5 K/UL (ref 3.9–12.7)

## 2022-07-21 PROCEDURE — 1159F MED LIST DOCD IN RCRD: CPT | Mod: CPTII,S$GLB,, | Performed by: NURSE PRACTITIONER

## 2022-07-21 PROCEDURE — 99213 PR OFFICE/OUTPT VISIT, EST, LEVL III, 20-29 MIN: ICD-10-PCS | Mod: S$GLB,,, | Performed by: NURSE PRACTITIONER

## 2022-07-21 PROCEDURE — 3008F PR BODY MASS INDEX (BMI) DOCUMENTED: ICD-10-PCS | Mod: CPTII,S$GLB,, | Performed by: NURSE PRACTITIONER

## 2022-07-21 PROCEDURE — 3074F PR MOST RECENT SYSTOLIC BLOOD PRESSURE < 130 MM HG: ICD-10-PCS | Mod: CPTII,S$GLB,, | Performed by: NURSE PRACTITIONER

## 2022-07-21 PROCEDURE — 3078F DIAST BP <80 MM HG: CPT | Mod: CPTII,S$GLB,, | Performed by: NURSE PRACTITIONER

## 2022-07-21 PROCEDURE — 99999 PR PBB SHADOW E&M-EST. PATIENT-LVL III: CPT | Mod: PBBFAC,,, | Performed by: NURSE PRACTITIONER

## 2022-07-21 PROCEDURE — 1159F PR MEDICATION LIST DOCUMENTED IN MEDICAL RECORD: ICD-10-PCS | Mod: CPTII,S$GLB,, | Performed by: NURSE PRACTITIONER

## 2022-07-21 PROCEDURE — 86480 TB TEST CELL IMMUN MEASURE: CPT | Performed by: NURSE PRACTITIONER

## 2022-07-21 PROCEDURE — 3008F BODY MASS INDEX DOCD: CPT | Mod: CPTII,S$GLB,, | Performed by: NURSE PRACTITIONER

## 2022-07-21 PROCEDURE — 82728 ASSAY OF FERRITIN: CPT | Performed by: NURSE PRACTITIONER

## 2022-07-21 PROCEDURE — 3078F PR MOST RECENT DIASTOLIC BLOOD PRESSURE < 80 MM HG: ICD-10-PCS | Mod: CPTII,S$GLB,, | Performed by: NURSE PRACTITIONER

## 2022-07-21 PROCEDURE — 99999 PR PBB SHADOW E&M-EST. PATIENT-LVL III: ICD-10-PCS | Mod: PBBFAC,,, | Performed by: NURSE PRACTITIONER

## 2022-07-21 PROCEDURE — 84466 ASSAY OF TRANSFERRIN: CPT | Performed by: NURSE PRACTITIONER

## 2022-07-21 PROCEDURE — 3074F SYST BP LT 130 MM HG: CPT | Mod: CPTII,S$GLB,, | Performed by: NURSE PRACTITIONER

## 2022-07-21 PROCEDURE — 36415 COLL VENOUS BLD VENIPUNCTURE: CPT | Mod: PO | Performed by: NURSE PRACTITIONER

## 2022-07-21 PROCEDURE — 99213 OFFICE O/P EST LOW 20 MIN: CPT | Mod: S$GLB,,, | Performed by: NURSE PRACTITIONER

## 2022-07-21 PROCEDURE — 85025 COMPLETE CBC W/AUTO DIFF WBC: CPT | Performed by: NURSE PRACTITIONER

## 2022-07-21 NOTE — PATIENT INSTRUCTIONS
//////////PHONE NUMBERS//////////    Bariatrics---[phone]  Breast Surgery---[phone]  Case Management---[phone]  Colonoscopy---[phone]  Imaging, Xray, CT, MRI, Ultrasound---[phone]  Infectious Disease---[phone]  Interventional Radiology---[phone]  Medical Records---[phone]  [facility]---[phone]  DME---[phone]  Optometry/Ophthalmology---[phone]  [facility] Bar---[phone]  Physical Therapy---[phone]  Psychiatry---[phone]  Plastic Surgery---[phone]  Sleep Study---[phone]  Smoking Cessation---[phone]  Vaccine Hotline---[phone]  Wound Care---[phone]  COVID Vaccine center---[phone]  Referral Desk---[phone]    /////////////////////////////////////////////////

## 2022-07-21 NOTE — PROGRESS NOTES
HPI     Chief Complaint:  Chief Complaint   Patient presents with    tb test       Erika Rodriguez is a 22 y.o. female with multiple medical diagnoses as listed in the medical history and problem list that presents for follow up and request for TB screen.  Pt is known to me with his her last appointment Visit date not found.      she is compliant with medications daily without any adverse side effects.    Assessment & Plan     Problem List Items Addressed This Visit        Neuro    Inattention    Current Assessment & Plan     Reports symptoms have improved. Pt is now taking Vyvanse 30 mg qd.         Reports symptoms have improved. Pt is now taking Vyvanse 30 mg qd. Pt has lost weight. Discussed potential AE of medication. Pt will discuss possibly lowering dose with her psychiatrist.     Follow up with psychiatry.     The current medical regimen is effective;  continue present plan and medications.                 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 Pt denies exposure or close contact to TB. Denies symptoms.     Orders placed.       Relevant Orders    QUANTIFERON GOLD TB    Low hemoglobin and low hematocrit        Lab Results   Component Value Date    WBC 9.43 06/25/2021    HGB 9.9 (L) 06/25/2021    HCT 33.0 (L) 06/25/2021    MCV 62 (L) 06/25/2021     06/25/2021         Pt states she was taking OTC iron but has not taken medication in > 6 months. Requesting recheck today.    Relevant Orders    CBC Auto Differential    Iron and TIBC    Ferritin          --------------------------------------------      Health Maintenance:  Health Maintenance       Date Due Completion Date    COVID-19 Vaccine (3 - Booster for Pfizer series) 11/24/2021 6/24/2021    Chlamydia Screening 06/25/2022 6/25/2021 (Done)    Override on 6/25/2021: Done    Influenza Vaccine (1) 09/01/2022 9/26/2021    Pap Smear 06/16/2024 6/16/2021 (Done)    Override on  6/16/2021: Done    TETANUS VACCINE 08/05/2031 8/5/2021          Health maintenance reviewed.  Pt declined chlamydia screening.     Follow Up:  Follow up in about 3 months (around 10/21/2022), or if symptoms worsen or fail to improve.    Exam     Review of Systems:  (as noted above)  Review of Systems   Constitutional: Negative for diaphoresis and fever.   HENT: Negative for trouble swallowing and voice change.    Eyes: Negative for visual disturbance.   Respiratory: Negative for chest tightness, shortness of breath and wheezing.    Cardiovascular: Negative for chest pain and palpitations.   Gastrointestinal: Negative for anal bleeding and blood in stool.   Endocrine: Negative for polydipsia and polyphagia.   Genitourinary: Negative for decreased urine volume, hematuria and vaginal pain.   Musculoskeletal: Negative for neck pain.   Skin: Negative for rash and wound.   Neurological: Negative for seizures and speech difficulty.   Psychiatric/Behavioral: Negative for confusion, decreased concentration, self-injury and suicidal ideas.       Physical Exam:   Physical Exam  Constitutional:       General: She is not in acute distress.     Appearance: She is not ill-appearing or diaphoretic.   HENT:      Head: Normocephalic and atraumatic.   Eyes:      General: No scleral icterus.     Pupils: Pupils are equal, round, and reactive to light.   Neck:      Vascular: No carotid bruit.   Cardiovascular:      Rate and Rhythm: Normal rate and regular rhythm.      Pulses: Normal pulses.      Heart sounds: No murmur heard.    No friction rub. No gallop.   Pulmonary:      Effort: No respiratory distress.      Breath sounds: No wheezing.   Chest:      Chest wall: No tenderness.   Musculoskeletal:         General: No signs of injury.      Cervical back: No rigidity or tenderness.   Lymphadenopathy:      Cervical: No cervical adenopathy.   Skin:     Capillary Refill: Capillary refill takes 2 to 3 seconds.      Findings: No rash.    Neurological:      Mental Status: She is alert.      Cranial Nerves: No cranial nerve deficit.      Sensory: No sensory deficit.      Motor: No weakness.       Vitals:    07/21/22 1306   BP: 110/60   BP Location: Left arm   Pulse: 106   Temp: 98.6 °F (37 °C)   TempSrc: Oral   SpO2: 99%   Weight: 56.4 kg (124 lb 7.2 oz)      Body mass index is 21.36 kg/m².        History     Past Medical History:  Past Medical History:   Diagnosis Date    UTI (urinary tract infection)        Past Surgical History:  History reviewed. No pertinent surgical history.    Social History:  Social History     Socioeconomic History    Marital status: Significant Other    Number of children: 0   Tobacco Use    Smoking status: Never Smoker    Smokeless tobacco: Never Used   Substance and Sexual Activity    Alcohol use: Yes     Comment: social    Drug use: No    Sexual activity: Yes     Partners: Male     Birth control/protection: OCP       Family History:  Family History   Problem Relation Age of Onset    Hypertension Father     Learning disabilities Father        Allergies and Medications: (updated and reviewed)  Review of patient's allergies indicates:  No Known Allergies  Current Outpatient Medications   Medication Sig Dispense Refill    lisdexamfetamine (VYVANSE) 30 MG capsule Take 1 capsule (30 mg total) by mouth every morning. 30 capsule 0    norgestimate-ethinyl estradioL (ORTHO TRI-CYCLEN LO) 0.18/0.215/0.25 mg-25 mcg tablet Take 1 tablet by mouth.      dextroamphetamine-amphetamine (ADDERALL) 5 mg Tab Take 1 tablet po q day at 1 pm prn inattention (Patient not taking: Reported on 7/21/2022) 15 tablet 0     No current facility-administered medications for this visit.       Patient Care Team:  Gemma Hernandes MD as PCP - General (Family Medicine)      The patient expressed understanding and no barriers to adherence were identified.      - The patient indicates understanding of these issues and agrees with the plan. Brief care  plan is updated and reviewed with the patient as applicable.      - The patient is given an After Visit Summary that lists all medications with directions, allergies, education, orders placed during this encounter and follow-up instructions.      - I have reviewed the patient's medical information including past medical, family, and social history sections including the medications and allergies.      - We discussed the patient's current medications.     This note was created by combination of typed  and MModal dictation.  Transcription errors may be present.  If there are any questions, please contact me.       Dante Kaplan NP

## 2022-07-21 NOTE — ASSESSMENT & PLAN NOTE
Reports symptoms have improved. Pt is now taking Vyvanse 30 mg qd.     Follow up with psychiatry.     The current medical regimen is effective;  continue present plan and medications.

## 2022-07-23 LAB
GAMMA INTERFERON BACKGROUND BLD IA-ACNC: 0 IU/ML
M TB IFN-G CD4+ BCKGRND COR BLD-ACNC: 0.02 IU/ML
MITOGEN IGNF BCKGRD COR BLD-ACNC: 10 IU/ML
TB GOLD PLUS: NEGATIVE
TB2 - NIL: 0 IU/ML

## 2022-07-26 DIAGNOSIS — F90.2 ATTENTION DEFICIT HYPERACTIVITY DISORDER (ADHD), COMBINED TYPE: ICD-10-CM

## 2022-07-26 RX ORDER — LISDEXAMFETAMINE DIMESYLATE 30 MG/1
30 CAPSULE ORAL EVERY MORNING
Qty: 30 CAPSULE | Refills: 0 | OUTPATIENT
Start: 2022-07-26 | End: 2022-08-25

## 2022-08-11 ENCOUNTER — OFFICE VISIT (OUTPATIENT)
Dept: PSYCHIATRY | Facility: CLINIC | Age: 22
End: 2022-08-11
Payer: COMMERCIAL

## 2022-08-11 VITALS
WEIGHT: 125.56 LBS | BODY MASS INDEX: 21.55 KG/M2 | SYSTOLIC BLOOD PRESSURE: 120 MMHG | HEART RATE: 96 BPM | DIASTOLIC BLOOD PRESSURE: 82 MMHG

## 2022-08-11 DIAGNOSIS — F90.2 ATTENTION DEFICIT HYPERACTIVITY DISORDER (ADHD), COMBINED TYPE: ICD-10-CM

## 2022-08-11 PROCEDURE — 3079F DIAST BP 80-89 MM HG: CPT | Mod: CPTII,S$GLB,, | Performed by: NURSE PRACTITIONER

## 2022-08-11 PROCEDURE — 3074F SYST BP LT 130 MM HG: CPT | Mod: CPTII,S$GLB,, | Performed by: NURSE PRACTITIONER

## 2022-08-11 PROCEDURE — 99213 PR OFFICE/OUTPT VISIT, EST, LEVL III, 20-29 MIN: ICD-10-PCS | Mod: S$GLB,,, | Performed by: NURSE PRACTITIONER

## 2022-08-11 PROCEDURE — 1159F MED LIST DOCD IN RCRD: CPT | Mod: CPTII,S$GLB,, | Performed by: NURSE PRACTITIONER

## 2022-08-11 PROCEDURE — 3079F PR MOST RECENT DIASTOLIC BLOOD PRESSURE 80-89 MM HG: ICD-10-PCS | Mod: CPTII,S$GLB,, | Performed by: NURSE PRACTITIONER

## 2022-08-11 PROCEDURE — 1160F PR REVIEW ALL MEDS BY PRESCRIBER/CLIN PHARMACIST DOCUMENTED: ICD-10-PCS | Mod: CPTII,S$GLB,, | Performed by: NURSE PRACTITIONER

## 2022-08-11 PROCEDURE — 3074F PR MOST RECENT SYSTOLIC BLOOD PRESSURE < 130 MM HG: ICD-10-PCS | Mod: CPTII,S$GLB,, | Performed by: NURSE PRACTITIONER

## 2022-08-11 PROCEDURE — 1159F PR MEDICATION LIST DOCUMENTED IN MEDICAL RECORD: ICD-10-PCS | Mod: CPTII,S$GLB,, | Performed by: NURSE PRACTITIONER

## 2022-08-11 PROCEDURE — 1160F RVW MEDS BY RX/DR IN RCRD: CPT | Mod: CPTII,S$GLB,, | Performed by: NURSE PRACTITIONER

## 2022-08-11 PROCEDURE — 99999 PR PBB SHADOW E&M-EST. PATIENT-LVL III: ICD-10-PCS | Mod: PBBFAC,,, | Performed by: NURSE PRACTITIONER

## 2022-08-11 PROCEDURE — 99213 OFFICE O/P EST LOW 20 MIN: CPT | Mod: S$GLB,,, | Performed by: NURSE PRACTITIONER

## 2022-08-11 PROCEDURE — 3008F BODY MASS INDEX DOCD: CPT | Mod: CPTII,S$GLB,, | Performed by: NURSE PRACTITIONER

## 2022-08-11 PROCEDURE — 99999 PR PBB SHADOW E&M-EST. PATIENT-LVL III: CPT | Mod: PBBFAC,,, | Performed by: NURSE PRACTITIONER

## 2022-08-11 PROCEDURE — 3008F PR BODY MASS INDEX (BMI) DOCUMENTED: ICD-10-PCS | Mod: CPTII,S$GLB,, | Performed by: NURSE PRACTITIONER

## 2022-08-11 RX ORDER — LISDEXAMFETAMINE DIMESYLATE 30 MG/1
CAPSULE ORAL
Qty: 30 CAPSULE | Refills: 0 | Status: SHIPPED | OUTPATIENT
Start: 2022-10-11 | End: 2023-03-30

## 2022-08-11 RX ORDER — LISDEXAMFETAMINE DIMESYLATE 30 MG/1
CAPSULE ORAL
Qty: 30 CAPSULE | Refills: 0 | Status: SHIPPED | OUTPATIENT
Start: 2022-08-11 | End: 2022-11-15 | Stop reason: SDUPTHER

## 2022-08-11 RX ORDER — LISDEXAMFETAMINE DIMESYLATE 30 MG/1
CAPSULE ORAL
Qty: 30 CAPSULE | Refills: 0 | Status: SHIPPED | OUTPATIENT
Start: 2022-09-11 | End: 2023-03-30

## 2022-08-11 NOTE — PROGRESS NOTES
8/11/2022 12:56 PM  Erika Rodriguez  2000  [id number]    Outpatient Psychiatry Follow-Up Visit (MD/NP)         Chief Complaint:  Erika Rodriguez, a 22 y.o. female,who presents today for follow up of ADHD.  Met with patient.      Interval History/Subjective Report/Content of Current Session:     Pt is a 22 y.o. female with a hx of ADHD.    Today the pt reports that she is happy with Vyvnase 30 mg. It is adequately controlling her sxs of inattention and lack of focus. However, she notes that two days a week, she needs the med to last longer than 4 pm because she has to study. Adderall IR 5 mg was added at the last visit. However, the pt notes it was not effective. She was afraid to try 10 mg, so she just stopped taking it. She wants to hold off on taking the Adderall IR for now and will wait and see how she does in school. Classes start next week. She only takes the med when she is in school. 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   Sleeping well. Mood is good.    She notes she was having heart palpitations but states she was drinking loaded teas at the time. The palpitations have completely subsided since she stopped drinking the teas. Pt denies tremor, tic, insomnia, and CP. Does notes a slight decrease in appetite and xerostomia but reports these are tolerable.    Most recent VS were reviewed. Pt does take drug holidays from the stimulant medication. Takes the medication Mon-Fri while she is in classes and clinicals. Pt denies recurrent thoughts of death and denies SI/HI. Denies any sxs of lexie. Denies AVH, paranoia and delusions. No objective s/sx of psychosis or lexie.     Discussed risks, benefits and SE profile of medication options.       Psychotherapy:  · Target symptoms: distractability, lack of focus  · Why chosen therapy is appropriate versus another modality: relevant to diagnosis, patient responds to this modality  · Outcome monitoring methods: self-report, observation  · Therapeutic  intervention type: supportive psychotherapy  · Topics discussed/themes: school stress, building skills sets for symptom management  · The patient's response to the intervention is accepting. The patient's progress toward treatment goals is good.   · Duration of intervention: 5 minutes.        Psychotropic medication review  Previous Trials-  None    Current meds-  Vyvanse        Review of Systems       Review of Systems   Constitutional: Negative for chills, fever and malaise/fatigue.   Respiratory: Negative for cough and shortness of breath.    Cardiovascular: Negative for chest pain and palpitations.   Gastrointestinal: Negative for abdominal pain, diarrhea and vomiting.   Musculoskeletal: Negative for falls and myalgias.   Skin: Negative for rash.   Neurological: Negative for tremors, seizures and headaches.   Psychiatric/Behavioral:        See HPI         Past Medical, Family and Social History: The patient's past medical, family and social history, allergies, current medications, past surgical history, and problem list have been reviewed and updated as appropriate within the electronic medical record.    Compliance: yes    Side effects: see above    Risk Parameters:  Patient reports no suicidal ideation  Patient reports no homicidal ideation  Patient reports no self-injurious behavior  Patient reports no violent behavior    Exam (detailed: at least 9 elements; comprehensive: all 15 elements)   Constitutional  Vitals:  Most recent vital signs, dated less than 90 days prior to this appointment, were reviewed.   Vitals:    08/11/22 1106   BP: 120/82   Pulse: 96   Weight: 56.9 kg (125 lb 8.8 oz)        General:  unremarkable, age appropriate, normal weight, well nourished, casually dressed, neatly groomed     Musculoskeletal  Muscle Strength/Tone:  no dyskinesia, no dystonia, no tremor, no tic   Gait & Station:  non-ataxic     Psychiatric      Appearance:  unremarkable, age appropriate, normal weight, well  nourished, casually dressed, neatly groomed   Behavior:  normal, friendly and cooperative, eye contact normal     Speech:  no latency; no press   Mood & Affect:  euthymic  congruent and appropriate   Thought Process:  normal and logical   Associations:  intact   Thought Content:  normal, no suicidality, no homicidality, delusions, or paranoia   Insight:  intact, has awareness of illness   Judgement: behavior is adequate to circumstances, age appropriate   Orientation:  grossly intact   Memory: intact for content of interview   Language: grossly intact   Attention Span & Concentration:  able to focus   Fund of Knowledge:  intact and appropriate to age and level of education     Medications:  Outpatient Encounter Medications as of 8/11/2022   Medication Sig Dispense Refill    dextroamphetamine-amphetamine (ADDERALL) 5 mg Tab Take 1 tablet po q day at 1 pm prn inattention (Patient not taking: Reported on 7/21/2022) 15 tablet 0    lisdexamfetamine (VYVANSE) 30 MG capsule Take 1 capsule (30 mg total) by mouth every morning. 30 capsule 0    norgestimate-ethinyl estradioL (ORTHO TRI-CYCLEN LO) 0.18/0.215/0.25 mg-25 mcg tablet Take 1 tablet by mouth.       No facility-administered encounter medications on file as of 8/11/2022.       Allergy:  Review of patient's allergies indicates:  No Known Allergies      Assessment and Diagnosis   Status/Progress: Based on the examination today, the patient's problem(s) is/are well controlled.  New problems have not been presented today.   Co-morbidities are not complicating management of the primary condition.  There are no active rule-out diagnoses for this patient at this time.         General Impression:       ICD-10-CM ICD-9-CM   1. Attention deficit hyperactivity disorder (ADHD), combined type  F90.2 314.01       Intervention/Counseling/Treatment Plan     · Medication Management:  · Continue Vyvanse 30 mg q am  · Will hold Adderall for now. Pt may resume Adderall IR 10 mg q day  at noon prn if needed.  · Labs: reviewed most recent  Prescription Monitoring Program queried.  · The treatment plan and follow up plan were reviewed with the patient.  · Discussed with patient informed consent, risks vs. benefits, alternative treatments, side effect profile and the inherent unpredictability of individual responses to these treatments. The patient expresses understanding of the above and displays the capacity to agree with this current plan and had no other questions.  · Encouraged Patient to keep future appointments.   · Take medications as prescribed and abstain from substance abuse.   · Pt was told to present to ED or call 911 for SI/HI plan or intent, psychosis, or other psychiatric or medical emergency, and pt verbalized understanding.      Return to Clinic: 3 months, or sooner if needed      Face-to-face time with patient:  20 minutes  Total time:  30 minutes of total time spent on the encounter, which includes face to face time and non-face to face time preparing to see the patient (eg, review of tests), Obtaining and/or reviewing separately obtained history, Documenting clinical information in the electronic or other health record, Independently interpreting results (not separately reported) and communicating results to the patient/family/caregiver, or Care coordination (not separately reported).       Gali Farias, MSN, APRN, PMHNP-BC [facility]

## 2022-11-15 ENCOUNTER — PATIENT MESSAGE (OUTPATIENT)
Dept: PSYCHIATRY | Facility: CLINIC | Age: 22
End: 2022-11-15
Payer: COMMERCIAL

## 2022-11-25 ENCOUNTER — OFFICE VISIT (OUTPATIENT)
Dept: PSYCHIATRY | Facility: CLINIC | Age: 22
End: 2022-11-25
Payer: COMMERCIAL

## 2022-11-25 DIAGNOSIS — F90.2 ATTENTION DEFICIT HYPERACTIVITY DISORDER (ADHD), COMBINED TYPE: Primary | ICD-10-CM

## 2022-11-25 PROCEDURE — 99213 OFFICE O/P EST LOW 20 MIN: CPT | Mod: 95,,, | Performed by: NURSE PRACTITIONER

## 2022-11-25 PROCEDURE — 1160F PR REVIEW ALL MEDS BY PRESCRIBER/CLIN PHARMACIST DOCUMENTED: ICD-10-PCS | Mod: CPTII,95,, | Performed by: NURSE PRACTITIONER

## 2022-11-25 PROCEDURE — 1159F MED LIST DOCD IN RCRD: CPT | Mod: CPTII,95,, | Performed by: NURSE PRACTITIONER

## 2022-11-25 PROCEDURE — 99212 OFFICE O/P EST SF 10 MIN: CPT | Performed by: NURSE PRACTITIONER

## 2022-11-25 PROCEDURE — 1160F RVW MEDS BY RX/DR IN RCRD: CPT | Mod: CPTII,95,, | Performed by: NURSE PRACTITIONER

## 2022-11-25 PROCEDURE — 99213 PR OFFICE/OUTPT VISIT, EST, LEVL III, 20-29 MIN: ICD-10-PCS | Mod: 95,,, | Performed by: NURSE PRACTITIONER

## 2022-11-25 PROCEDURE — 1159F PR MEDICATION LIST DOCUMENTED IN MEDICAL RECORD: ICD-10-PCS | Mod: CPTII,95,, | Performed by: NURSE PRACTITIONER

## 2022-11-25 RX ORDER — LISDEXAMFETAMINE DIMESYLATE 30 MG/1
CAPSULE ORAL
Qty: 30 CAPSULE | Refills: 0 | Status: SHIPPED | OUTPATIENT
Start: 2022-12-25 | End: 2023-03-30

## 2022-11-25 RX ORDER — LISDEXAMFETAMINE DIMESYLATE 30 MG/1
CAPSULE ORAL
Qty: 30 CAPSULE | Refills: 0 | Status: SHIPPED | OUTPATIENT
Start: 2023-01-25 | End: 2023-03-30

## 2022-11-25 RX ORDER — LISDEXAMFETAMINE DIMESYLATE 30 MG/1
CAPSULE ORAL
Qty: 30 CAPSULE | Refills: 0 | Status: SHIPPED | OUTPATIENT
Start: 2022-11-25 | End: 2023-03-30

## 2022-11-25 NOTE — PROGRESS NOTES
11/25/2022 12:56 PM  Erika Rodriguez  2000  [id number]    Outpatient Psychiatry Follow-Up Visit (MD/NP)         Chief Complaint:  Erika Rodriguez, a 22 y.o. female,who presents today for follow up of ADHD.  Met with patient.      Interval History/Subjective Report/Content of Current Session:     Pt is a 22 y.o. female with a hx of ADHD.    Today the pt reports that she is happy with Vyvnase 30 mg. It is adequately controlling her sxs of inattention and lack of focus. She does not take Adderall. She only takes the med when she is in school. 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   Sleeping well. Mood is good.    Reports she has had no further heart palpitations since she stopped drinking loaded teas. Pt denies tremor, tic, insomnia, and CP.     Pt denies anorexia, tremor, tic, insomnia, headache, GI upset, CP, and heart palpitations. Most recent documented VS reviewed.   Pt does take drug holidays from the stimulant medication. Takes the medication Mon-Fri while she is in classes and clinicals. Pt denies recurrent thoughts of death and denies SI/HI. Denies any sxs of lexie. Denies AVH, paranoia and delusions. No objective s/sx of psychosis or lexie.         Psychotherapy:  Target symptoms: distractability, lack of focus  Why chosen therapy is appropriate versus another modality: relevant to diagnosis, patient responds to this modality  Outcome monitoring methods: self-report, observation  Therapeutic intervention type: supportive psychotherapy  Topics discussed/themes:  school stress, building skills sets for symptom management  The patient's response to the intervention is accepting. The patient's progress toward treatment goals is good.   Duration of intervention: 3 minutes.        Psychotropic medication review  Previous Trials-  Adderall    Current meds-  Vyvanse        Review of Systems       Review of Systems   Constitutional:  Negative for chills, fever and malaise/fatigue.   Respiratory:  Negative  for cough and shortness of breath.    Cardiovascular:  Negative for chest pain and palpitations.   Gastrointestinal:  Negative for abdominal pain, diarrhea and vomiting.   Musculoskeletal:  Negative for falls and myalgias.   Skin:  Negative for rash.   Neurological:  Negative for tremors, seizures and headaches.   Psychiatric/Behavioral:          See HPI       Past Medical, Family and Social History: The patient's past medical, family and social history, allergies, current medications, past surgical history, and problem list have been reviewed and updated as appropriate within the electronic medical record.    Compliance: yes    Side effects: see above    Risk Parameters:  Patient reports no suicidal ideation  Patient reports no homicidal ideation  Patient reports no self-injurious behavior  Patient reports no violent behavior    Exam (detailed: at least 9 elements; comprehensive: all 15 elements)   Constitutional  Vitals:  Most recent vital signs, dated greater than 90 days prior to this appointment, were reviewed.   There were no vitals filed for this visit.       General:  unremarkable, age appropriate, normal weight, well nourished, casually dressed, neatly groomed     Musculoskeletal  Muscle Strength/Tone:  not examined - STEVIE due to virtual visit   Gait & Station:  STEVIE due to virtual visit     Psychiatric      Appearance:  unremarkable, age appropriate, normal weight, well nourished, casually dressed, neatly groomed   Behavior:  normal, friendly and cooperative, eye contact normal     Speech:  no latency; no press   Mood & Affect:  euthymic  congruent and appropriate   Thought Process:  normal and logical   Associations:  intact   Thought Content:  normal, no suicidality, no homicidality, delusions, or paranoia   Insight:  intact, has awareness of illness   Judgement: behavior is adequate to circumstances, age appropriate   Orientation:  grossly intact   Memory: intact for content of interview   Language:  grossly intact   Attention Span & Concentration:  able to focus   Fund of Knowledge:  intact and appropriate to age and level of education     Medications:  Outpatient Encounter Medications as of 11/25/2022   Medication Sig Dispense Refill    lisdexamfetamine (VYVANSE) 30 MG capsule Take 1 capsule po q am prn inattention 30 capsule 0    lisdexamfetamine (VYVANSE) 30 MG capsule Take 1 capsule po q am prn inattention 30 capsule 0    lisdexamfetamine (VYVANSE) 30 MG capsule Take 1 capsule po q am prn inattention 15 capsule 0    norgestimate-ethinyl estradioL (ORTHO TRI-CYCLEN LO) 0.18/0.215/0.25 mg-25 mcg tablet Take 1 tablet by mouth.       No facility-administered encounter medications on file as of 11/25/2022.       Allergy:  Review of patient's allergies indicates:  No Known Allergies      Assessment and Diagnosis   Status/Progress: Based on the examination today, the patient's problem(s) is/are well controlled.  New problems have not been presented today.   Co-morbidities are not complicating management of the primary condition.  There are no active rule-out diagnoses for this patient at this time.         General Impression:       ICD-10-CM ICD-9-CM   1. Attention deficit hyperactivity disorder (ADHD), combined type  F90.2 314.01       Intervention/Counseling/Treatment Plan     Medication Management:  Continue Vyvanse 30 mg q day prn inattention  Labs: reviewed most recent  Prescription Monitoring Program queried.  The treatment plan and follow up plan were reviewed with the patient.  Discussed with patient informed consent, risks vs. benefits, alternative treatments, side effect profile and the inherent unpredictability of individual responses to these treatments. The patient expresses understanding of the above and displays the capacity to agree with this current plan and had no other questions.  Encouraged Patient to keep future appointments.   Take medications as prescribed and abstain from substance abuse.    Pt was told to present to ED or call 911 for SI/HI plan or intent, psychosis, or other psychiatric or medical emergency, and pt verbalized understanding.      Return to Clinic: 3 months, or sooner if needed      Face-to-face time with patient:  15 minutes  Total time:  25 minutes of total time spent on the encounter, which includes face to face time and non-face to face time preparing to see the patient (eg, review of tests), Obtaining and/or reviewing separately obtained history, Documenting clinical information in the electronic or other health record, Independently interpreting results (not separately reported) and communicating results to the patient/family/caregiver, or Care coordination (not separately reported).       Gail Farias, MSN, APRN, PMHNP-BC  [facility]

## 2023-03-30 ENCOUNTER — OFFICE VISIT (OUTPATIENT)
Dept: PSYCHIATRY | Facility: CLINIC | Age: 23
End: 2023-03-30
Payer: COMMERCIAL

## 2023-03-30 ENCOUNTER — PATIENT MESSAGE (OUTPATIENT)
Dept: PSYCHIATRY | Facility: CLINIC | Age: 23
End: 2023-03-30
Payer: COMMERCIAL

## 2023-03-30 VITALS
WEIGHT: 127.44 LBS | DIASTOLIC BLOOD PRESSURE: 78 MMHG | HEART RATE: 80 BPM | SYSTOLIC BLOOD PRESSURE: 120 MMHG | BODY MASS INDEX: 21.87 KG/M2

## 2023-03-30 DIAGNOSIS — F90.2 ATTENTION DEFICIT HYPERACTIVITY DISORDER (ADHD), COMBINED TYPE: Primary | ICD-10-CM

## 2023-03-30 PROCEDURE — 99999 PR PBB SHADOW E&M-EST. PATIENT-LVL II: ICD-10-PCS | Mod: PBBFAC,,, | Performed by: NURSE PRACTITIONER

## 2023-03-30 PROCEDURE — 3074F SYST BP LT 130 MM HG: CPT | Mod: CPTII,S$GLB,, | Performed by: NURSE PRACTITIONER

## 2023-03-30 PROCEDURE — 3078F DIAST BP <80 MM HG: CPT | Mod: CPTII,S$GLB,, | Performed by: NURSE PRACTITIONER

## 2023-03-30 PROCEDURE — 3078F PR MOST RECENT DIASTOLIC BLOOD PRESSURE < 80 MM HG: ICD-10-PCS | Mod: CPTII,S$GLB,, | Performed by: NURSE PRACTITIONER

## 2023-03-30 PROCEDURE — 99214 PR OFFICE/OUTPT VISIT, EST, LEVL IV, 30-39 MIN: ICD-10-PCS | Mod: S$GLB,,, | Performed by: NURSE PRACTITIONER

## 2023-03-30 PROCEDURE — 99999 PR PBB SHADOW E&M-EST. PATIENT-LVL II: CPT | Mod: PBBFAC,,, | Performed by: NURSE PRACTITIONER

## 2023-03-30 PROCEDURE — 3008F PR BODY MASS INDEX (BMI) DOCUMENTED: ICD-10-PCS | Mod: CPTII,S$GLB,, | Performed by: NURSE PRACTITIONER

## 2023-03-30 PROCEDURE — 1159F PR MEDICATION LIST DOCUMENTED IN MEDICAL RECORD: ICD-10-PCS | Mod: CPTII,S$GLB,, | Performed by: NURSE PRACTITIONER

## 2023-03-30 PROCEDURE — 1160F RVW MEDS BY RX/DR IN RCRD: CPT | Mod: CPTII,S$GLB,, | Performed by: NURSE PRACTITIONER

## 2023-03-30 PROCEDURE — 1160F PR REVIEW ALL MEDS BY PRESCRIBER/CLIN PHARMACIST DOCUMENTED: ICD-10-PCS | Mod: CPTII,S$GLB,, | Performed by: NURSE PRACTITIONER

## 2023-03-30 PROCEDURE — 1159F MED LIST DOCD IN RCRD: CPT | Mod: CPTII,S$GLB,, | Performed by: NURSE PRACTITIONER

## 2023-03-30 PROCEDURE — 3008F BODY MASS INDEX DOCD: CPT | Mod: CPTII,S$GLB,, | Performed by: NURSE PRACTITIONER

## 2023-03-30 PROCEDURE — 3074F PR MOST RECENT SYSTOLIC BLOOD PRESSURE < 130 MM HG: ICD-10-PCS | Mod: CPTII,S$GLB,, | Performed by: NURSE PRACTITIONER

## 2023-03-30 PROCEDURE — 99214 OFFICE O/P EST MOD 30 MIN: CPT | Mod: S$GLB,,, | Performed by: NURSE PRACTITIONER

## 2023-03-30 RX ORDER — DEXTROAMPHETAMINE SACCHARATE, AMPHETAMINE ASPARTATE MONOHYDRATE, DEXTROAMPHETAMINE SULFATE AND AMPHETAMINE SULFATE 2.5; 2.5; 2.5; 2.5 MG/1; MG/1; MG/1; MG/1
CAPSULE, EXTENDED RELEASE ORAL
Qty: 30 CAPSULE | Refills: 0 | Status: SHIPPED | OUTPATIENT
Start: 2023-03-30 | End: 2023-04-24 | Stop reason: SDUPTHER

## 2023-03-30 NOTE — PROGRESS NOTES
3/30/2023 12:56 PM  Erika Rodriguez  2000  [id number]    Outpatient Psychiatry Follow-Up Visit (MD/NP)         Chief Complaint:  Erika Rodriguez, a 22 y.o. female,who presents today for follow up of ADHD.  Met with patient.      Interval History/Subjective Report/Content of Current Session:     Pt is a 22 y.o. female with a hx of ADHD.    The pt reports that she really likes Vyvanse and that it is adequately controlling her sxs of inattention and lack of focus. However, she becomes very irritable when it wears off in the afternoon. In addition, she develops a severe headache when she takes the medication after skipping a day or two. We discussed options and she would like to try Adderall. I explained that this may be likely to cause headaches and that, due to the national shortage, she may not be able to find the drug. She verbalized understanding of this but states she would still like to try it.      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  Sleeping well. Mood is good.    ASEs from psych meds: see above    Pt denies anorexia, tremor, tic, insomnia, headache, GI upset, CP, and heart palpitations. Most recent documented VS reviewed. Pt does take drug holidays from the stimulant medication. Takes the medication Mon-Fri while she is in classes and clinicals.    Pt denies recurrent thoughts of death and denies SI/HI. Denies any sxs of lexie. Denies AVH, paranoia and delusions. No objective s/sx of psychosis, lexie, or hypomania.         Psychotherapy:  Target symptoms: distractability, lack of focus  Why chosen therapy is appropriate versus another modality: relevant to diagnosis, patient responds to this modality  Outcome monitoring methods: self-report, observation  Therapeutic intervention type: supportive psychotherapy  Topics discussed/themes:  school stress, building skills sets for symptom  management  The patient's response to the intervention is accepting. The patient's progress toward treatment goals is good.   Duration of intervention: 5 minutes.        Psychotropic medication review  Previous Trials-  Adderall IR 5 mg x 1 day    Current meds-  Vyvanse        Review of Systems       Review of Systems   Constitutional:  Negative for chills, fever and malaise/fatigue.   Respiratory:  Negative for cough and shortness of breath.    Cardiovascular:  Negative for chest pain and palpitations.   Gastrointestinal:  Negative for abdominal pain, diarrhea and vomiting.   Musculoskeletal:  Negative for falls and myalgias.   Skin:  Negative for rash.   Neurological:  Negative for tremors, seizures and headaches.   Psychiatric/Behavioral:          See HPI       Past Medical, Family and Social History: The patient's past medical, family and social history, allergies, current medications, past surgical history, and problem list have been reviewed and updated as appropriate within the electronic medical record.    Compliance: yes      Risk Parameters:  Patient reports no suicidal ideation  Patient reports no homicidal ideation  Patient reports no self-injurious behavior  Patient reports no violent behavior    Exam (detailed: at least 9 elements; comprehensive: all 15 elements)   Constitutional  Vitals:  Most recent vital signs, dated less than 90 days prior to this appointment, were reviewed.   Vitals:    03/30/23 1130   BP: 120/78   Pulse: 80   Weight: 57.8 kg (127 lb 6.8 oz)        General:  unremarkable, age appropriate, normal weight, well nourished, casually dressed, neatly groomed     Musculoskeletal  Muscle Strength/Tone:  no dyskinesia, no dystonia, no tremor, no tic   Gait & Station:  non-ataxic     Psychiatric      Appearance:  unremarkable, age appropriate, normal weight, well nourished, casually dressed, neatly groomed   Behavior:  normal, friendly and cooperative, eye contact normal     Speech:  no  latency; no press   Mood & Affect:  euthymic  congruent and appropriate   Thought Process:  normal and logical   Associations:  intact   Thought Content:  normal, no suicidality, no homicidality, delusions, or paranoia   Insight:  intact, has awareness of illness   Judgement: behavior is adequate to circumstances, age appropriate   Orientation:  grossly intact   Memory: intact for content of interview   Language: grossly intact   Attention Span & Concentration:  able to focus   Fund of Knowledge:  intact and appropriate to age and level of education     Medications:  Outpatient Encounter Medications as of 3/30/2023   Medication Sig Dispense Refill    lisdexamfetamine (VYVANSE) 30 MG capsule Take 1 capsule po q am prn inattention 30 capsule 0    lisdexamfetamine (VYVANSE) 30 MG capsule Take 1 capsule po q am prn inattention 30 capsule 0    lisdexamfetamine (VYVANSE) 30 MG capsule Take 1 capsule po q am prn inattention 30 capsule 0    lisdexamfetamine (VYVANSE) 30 MG capsule Take 1 capsule po q am prn inattention 30 capsule 0    lisdexamfetamine (VYVANSE) 30 MG capsule Take 1 capsule po q am prn inattention 30 capsule 0    norgestimate-ethinyl estradioL (ORTHO TRI-CYCLEN LO) 0.18/0.215/0.25 mg-25 mcg tablet Take 1 tablet by mouth.       No facility-administered encounter medications on file as of 3/30/2023.       Allergy:  Review of patient's allergies indicates:  No Known Allergies      Assessment and Diagnosis   Status/Progress: Based on the examination today, the patient's problem(s) is/are well controlled.  New problems have not been presented today.   Co-morbidities are not complicating management of the primary condition.  There are no active rule-out diagnoses for this patient at this time.         General Impression:       ICD-10-CM ICD-9-CM   1. Attention deficit hyperactivity disorder (ADHD), combined type  F90.2 314.01       Intervention/Counseling/Treatment Plan     Medication Management:  Discontinue  Vyvanse  Start Adderall XR 10 mg q am prn inattention  Labs: reviewed most recent  Stimulant: Discussed the risks of stimulants including addiction, tolerance, and possible withdrawal associated with stimulant use. Possible side effects including heart palpitations, restlessness, increased anxiety, decreased appetite, insomnia, and dry mouth were also discussed with the patient. Patient advised not to mix the medication with alcohol, and pt verbalized understanding.  Discussed informed consent, diagnosis, risks and benefits of proposed treatment vs alternative treatments vs no treatment, and potential side effects of these treatments (death, dependency, psychosis, lexie, aggression, HTN, ticks, MI, stroke, arrythmia, seizure, anaphylaxis or other allergic reactions, leukopenia, nervousness, anorexia, insomnia, tachycardia, palpitations, dizziness, BP changes, HR changes, visual disturbance, etc.). The risks and benefits of medication were discussed with the patient. The patient expresses understanding of the above and displays the capacity to agree with this treatment given said understanding. Patient also agrees that, currently, the benefits outweigh the risks and would like to pursue treatment at this time.  Prescription Monitoring Program queried.  The treatment plan and follow up plan were reviewed with the patient.  Discussed with patient informed consent, risks vs. benefits, alternative treatments, side effect profile and the inherent unpredictability of individual responses to these treatments. The patient expresses understanding of the above and displays the capacity to agree with this current plan and had no other questions.  Encouraged Patient to keep future appointments.   Take medications as prescribed and abstain from substance abuse.   Pt was told to present to ED or call 911 for SI/HI plan or intent, psychosis, or other psychiatric or medical emergency, and pt verbalized understanding.      Return to  Clinic: 3 months, or sooner if needed      Face-to-face time with patient:  17 minutes  Total time:  21 minutes of total time spent on the encounter, which includes face to face time and non-face to face time preparing to see the patient (eg, review of tests), Obtaining and/or reviewing separately obtained history, Documenting clinical information in the electronic or other health record, Independently interpreting results (not separately reported) and communicating results to the patient/family/caregiver, or Care coordination (not separately reported).       Gail Farias, MSN, APRN, PMHNP-BC [facility]

## 2023-04-24 ENCOUNTER — OFFICE VISIT (OUTPATIENT)
Dept: PSYCHIATRY | Facility: CLINIC | Age: 23
End: 2023-04-24
Payer: COMMERCIAL

## 2023-04-24 DIAGNOSIS — F90.2 ATTENTION DEFICIT HYPERACTIVITY DISORDER (ADHD), COMBINED TYPE: ICD-10-CM

## 2023-04-24 PROCEDURE — 1159F MED LIST DOCD IN RCRD: CPT | Mod: CPTII,95,, | Performed by: NURSE PRACTITIONER

## 2023-04-24 PROCEDURE — 1159F PR MEDICATION LIST DOCUMENTED IN MEDICAL RECORD: ICD-10-PCS | Mod: CPTII,95,, | Performed by: NURSE PRACTITIONER

## 2023-04-24 PROCEDURE — 1160F RVW MEDS BY RX/DR IN RCRD: CPT | Mod: CPTII,95,, | Performed by: NURSE PRACTITIONER

## 2023-04-24 PROCEDURE — 99214 OFFICE O/P EST MOD 30 MIN: CPT | Mod: 95,,, | Performed by: NURSE PRACTITIONER

## 2023-04-24 PROCEDURE — 1160F PR REVIEW ALL MEDS BY PRESCRIBER/CLIN PHARMACIST DOCUMENTED: ICD-10-PCS | Mod: CPTII,95,, | Performed by: NURSE PRACTITIONER

## 2023-04-24 PROCEDURE — 99214 PR OFFICE/OUTPT VISIT, EST, LEVL IV, 30-39 MIN: ICD-10-PCS | Mod: 95,,, | Performed by: NURSE PRACTITIONER

## 2023-04-24 RX ORDER — DEXTROAMPHETAMINE SACCHARATE, AMPHETAMINE ASPARTATE MONOHYDRATE, DEXTROAMPHETAMINE SULFATE AND AMPHETAMINE SULFATE 5; 5; 5; 5 MG/1; MG/1; MG/1; MG/1
CAPSULE, EXTENDED RELEASE ORAL
Qty: 30 CAPSULE | Refills: 0 | Status: SHIPPED | OUTPATIENT
Start: 2023-04-24 | End: 2023-12-11

## 2023-04-24 RX ORDER — NORGESTIMATE AND ETHINYL ESTRADIOL 7DAYSX3 LO
1 KIT ORAL
COMMUNITY
Start: 2023-04-10

## 2023-04-24 NOTE — PROGRESS NOTES
"4/24/2023 12:56 PM  Erika Rodriguez  2000  [id number]    Outpatient Psychiatry Follow-Up Visit (MD/NP) - Telemedicine Visit    The patient location is: Patient reported that their location at the time of this visit was in the [facility]       Visit type: audiovisual    Each patient to whom he or she provides medical services by telemedicine is:  (1) informed of the relationship between the physician and patient and the respective role of any other health care provider with respect to management of the patient; and (2) notified that he or she may decline to receive medical services by telemedicine and may withdraw from such care at any time.      Chief Complaint:  Erika Rodriguez, a 22 y.o. female,who presents today for follow up of ADHD.  Met with patient.      Interval History/Subjective Report/Content of Current Session:     Pt is a 22 y.o. female with a hx of ADHD.    The pt reports Adderall XR 10 mg is not effective. In addition, she feels "like it drains me" once it wears off in the evening. She would like to try an increased dose of the medication.    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    Sleeping well. Mood is good.    ASEs from psych meds: see above    Pt denies anorexia, tremor, tic, insomnia, headache, GI upset, CP, and heart palpitations. Most recent documented VS reviewed. Pt does take drug holidays from the stimulant medication. Takes the medication Mon-Fri while she is in classes and clinicals.    Pt denies recurrent thoughts of death and denies SI/HI. Denies any sxs of lexie. Denies AVH, paranoia and delusions. No objective s/sx of psychosis, lexie, or hypomania.         Psychotherapy:  Target symptoms: distractability, lack of focus  Why chosen therapy is appropriate versus another modality: relevant to diagnosis, patient responds to this modality  Outcome monitoring " methods: self-report, observation  Therapeutic intervention type: supportive psychotherapy  Topics discussed/themes: building skills sets for symptom management  The patient's response to the intervention is accepting. The patient's progress toward treatment goals is good.   Duration of intervention: 3 minutes.        Psychotropic medication review  Previous Trials-  Adderall IR 5 mg x 1 day  Vyvanse    Current meds-  Adderall XR        Review of Systems       Review of Systems   Constitutional:  Negative for chills, fever and malaise/fatigue.   Respiratory:  Negative for cough and shortness of breath.    Cardiovascular:  Negative for chest pain and palpitations.   Gastrointestinal:  Negative for abdominal pain, diarrhea and vomiting.   Musculoskeletal:  Negative for falls and myalgias.   Skin:  Negative for rash.   Neurological:  Negative for tremors, seizures and headaches.   Psychiatric/Behavioral:          See HPI       Past Medical, Family and Social History: The patient's past medical, family and social history, allergies, current medications, past surgical history, and problem list have been reviewed and updated as appropriate within the electronic medical record.    Compliance: yes      Risk Parameters:  Patient reports no suicidal ideation  Patient reports no homicidal ideation  Patient reports no self-injurious behavior  Patient reports no violent behavior    Exam (detailed: at least 9 elements; comprehensive: all 15 elements)   Constitutional  Vitals:  Most recent vital signs, dated less than 90 days prior to this appointment, were reviewed.   There were no vitals filed for this visit.       General:  unremarkable, age appropriate, normal weight, well nourished, casually dressed, neatly groomed     Musculoskeletal  Muscle Strength/Tone:  not examined - STEVIE due to virtual visit   Gait & Station:  STEVIE due to virtual visit     Psychiatric      Appearance:  unremarkable, age appropriate, normal weight, well  nourished, casually dressed, neatly groomed   Behavior:  normal, friendly and cooperative, eye contact normal     Speech:  no latency; no press   Mood & Affect:  euthymic  congruent and appropriate   Thought Process:  normal and logical   Associations:  intact   Thought Content:  normal, no suicidality, no homicidality, delusions, or paranoia   Insight:  intact, has awareness of illness   Judgement: behavior is adequate to circumstances, age appropriate   Orientation:  grossly intact   Memory: intact for content of interview   Language: grossly intact   Attention Span & Concentration:  able to focus   Fund of Knowledge:  intact and appropriate to age and level of education     Medications:  Outpatient Encounter Medications as of 4/24/2023   Medication Sig Dispense Refill    dextroamphetamine-amphetamine (ADDERALL XR) 10 MG 24 hr capsule Take 1 capsule po q am prn inattention 30 capsule 0    TRI-LO-JERSEY 0.18/0.215/0.25 mg-25 mcg tablet Take 1 tablet by mouth.       No facility-administered encounter medications on file as of 4/24/2023.       Allergy:  Review of patient's allergies indicates:  No Known Allergies      Assessment and Diagnosis   Status/Progress: Based on the examination today, the patient's problem(s) is/are well controlled.  New problems have not been presented today.   Co-morbidities are not complicating management of the primary condition.  There are no active rule-out diagnoses for this patient at this time.         General Impression:       ICD-10-CM ICD-9-CM   1. Attention deficit hyperactivity disorder (ADHD), combined type  F90.2 314.01       Intervention/Counseling/Treatment Plan     Medication Management:  Increase Adderall XR to 20 mg q am prn inattention  Labs: reviewed most recent  Stimulant: Discussed the risks of stimulants including addiction, tolerance, and possible withdrawal associated with stimulant use. Possible side effects including heart palpitations, restlessness, increased  anxiety, decreased appetite, insomnia, and dry mouth were also discussed with the patient. Patient advised not to mix the medication with alcohol, and pt verbalized understanding.  Discussed informed consent, diagnosis, risks and benefits of proposed treatment vs alternative treatments vs no treatment, and potential side effects of these treatments (death, dependency, psychosis, lexie, aggression, HTN, ticks, MI, stroke, arrythmia, seizure, anaphylaxis or other allergic reactions, leukopenia, nervousness, anorexia, insomnia, tachycardia, palpitations, dizziness, BP changes, HR changes, visual disturbance, etc.). The risks and benefits of medication were discussed with the patient. The patient expresses understanding of the above and displays the capacity to agree with this treatment given said understanding. Patient also agrees that, currently, the benefits outweigh the risks and would like to pursue treatment at this time.  Prescription Monitoring Program queried.  The treatment plan and follow up plan were reviewed with the patient.  Discussed with patient informed consent, risks vs. benefits, alternative treatments, side effect profile and the inherent unpredictability of individual responses to these treatments. The patient expresses understanding of the above and displays the capacity to agree with this current plan and had no other questions.  Encouraged Patient to keep future appointments.   Take medications as prescribed and abstain from substance abuse.   Pt was told to present to ED or call 911 for SI/HI plan or intent, psychosis, or other psychiatric or medical emergency, and pt verbalized understanding.      Return to Clinic: 2 months, or sooner if needed      Face-to-face time with patient:  15 minutes  Total time:  19 minutes of total time spent on the encounter, which includes face to face time and non-face to face time preparing to see the patient (eg, review of tests), Obtaining and/or reviewing  separately obtained history, Documenting clinical information in the electronic or other health record, Independently interpreting results (not separately reported) and communicating results to the patient/family/caregiver, or Care coordination (not separately reported).       Gail Farias, MSN, APRN, PMHNP-BC [facility]

## 2023-05-22 ENCOUNTER — OFFICE VISIT (OUTPATIENT)
Dept: PSYCHIATRY | Facility: CLINIC | Age: 23
End: 2023-05-22
Payer: COMMERCIAL

## 2023-05-22 DIAGNOSIS — F90.2 ATTENTION DEFICIT HYPERACTIVITY DISORDER (ADHD), COMBINED TYPE: ICD-10-CM

## 2023-05-22 PROCEDURE — 1159F MED LIST DOCD IN RCRD: CPT | Mod: CPTII,95,, | Performed by: NURSE PRACTITIONER

## 2023-05-22 PROCEDURE — 1160F PR REVIEW ALL MEDS BY PRESCRIBER/CLIN PHARMACIST DOCUMENTED: ICD-10-PCS | Mod: CPTII,95,, | Performed by: NURSE PRACTITIONER

## 2023-05-22 PROCEDURE — 1160F RVW MEDS BY RX/DR IN RCRD: CPT | Mod: CPTII,95,, | Performed by: NURSE PRACTITIONER

## 2023-05-22 PROCEDURE — 99213 PR OFFICE/OUTPT VISIT, EST, LEVL III, 20-29 MIN: ICD-10-PCS | Mod: 95,,, | Performed by: NURSE PRACTITIONER

## 2023-05-22 PROCEDURE — 1159F PR MEDICATION LIST DOCUMENTED IN MEDICAL RECORD: ICD-10-PCS | Mod: CPTII,95,, | Performed by: NURSE PRACTITIONER

## 2023-05-22 PROCEDURE — 99213 OFFICE O/P EST LOW 20 MIN: CPT | Mod: 95,,, | Performed by: NURSE PRACTITIONER

## 2023-05-22 RX ORDER — DEXTROAMPHETAMINE SACCHARATE, AMPHETAMINE ASPARTATE MONOHYDRATE, DEXTROAMPHETAMINE SULFATE AND AMPHETAMINE SULFATE 7.5; 7.5; 7.5; 7.5 MG/1; MG/1; MG/1; MG/1
CAPSULE, EXTENDED RELEASE ORAL
Qty: 30 CAPSULE | Refills: 0 | Status: SHIPPED | OUTPATIENT
Start: 2023-05-22 | End: 2023-12-11

## 2023-05-22 RX ORDER — DEXTROAMPHETAMINE SACCHARATE, AMPHETAMINE ASPARTATE MONOHYDRATE, DEXTROAMPHETAMINE SULFATE AND AMPHETAMINE SULFATE 7.5; 7.5; 7.5; 7.5 MG/1; MG/1; MG/1; MG/1
CAPSULE, EXTENDED RELEASE ORAL
Qty: 30 CAPSULE | Refills: 0 | Status: SHIPPED | OUTPATIENT
Start: 2023-07-21 | End: 2023-06-28 | Stop reason: SDUPTHER

## 2023-05-22 RX ORDER — DEXTROAMPHETAMINE SACCHARATE, AMPHETAMINE ASPARTATE MONOHYDRATE, DEXTROAMPHETAMINE SULFATE AND AMPHETAMINE SULFATE 7.5; 7.5; 7.5; 7.5 MG/1; MG/1; MG/1; MG/1
CAPSULE, EXTENDED RELEASE ORAL
Qty: 30 CAPSULE | Refills: 0 | Status: SHIPPED | OUTPATIENT
Start: 2023-06-21 | End: 2023-12-11

## 2023-05-22 NOTE — PROGRESS NOTES
5/22/2023 12:56 PM  Erika Rodriguez  2000  [id number]    Outpatient Psychiatry Follow-Up Visit (MD/NP) - Telemedicine Visit    The patient location is: Patient reported that their location at the time of this visit was in the [facility]       Visit type: audiovisual    Each patient to whom he or she provides medical services by telemedicine is:  (1) informed of the relationship between the physician and patient and the respective role of any other health care provider with respect to management of the patient; and (2) notified that he or she may decline to receive medical services by telemedicine and may withdraw from such care at any time.      Chief Complaint:  Erika Rodriguez, a 22 y.o. female,who presents today for follow up of ADHD.  Met with patient.      Interval History/Subjective Report/Content of Current Session:     Pt is a 22 y.o. female with a hx of ADHD.    She tried the Adderall XR 20 mg but found it did not last long enough, so she took an XR 20 mg cap with an XR 10 mg cap and found this to be an effective dose. Is able to concentrate throughout the day.    She is at [employer] studying to become an x-ray tech. 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    Sleeping well. Mood is good.    ASEs from psych meds: denies    Pt denies anorexia, tremor, tic, insomnia, headache, GI upset, CP, and heart palpitations. Most recent documented VS reviewed. Pt does take drug holidays from the stimulant medication. Takes the medication Mon-Fri while she is in classes and clinicals.    Pt denies recurrent thoughts of death and denies SI/HI. Denies any sxs of lexie. Denies AVH, paranoia and delusions. No objective s/sx of psychosis, lexie, or hypomania.         Psychotherapy:  Target symptoms: distractability, lack of focus  Why chosen therapy is appropriate versus another modality: relevant to diagnosis, patient responds to this modality  Outcome  monitoring methods: self-report, observation  Therapeutic intervention type: supportive psychotherapy  Topics discussed/themes: building skills sets for symptom management  The patient's response to the intervention is accepting. The patient's progress toward treatment goals is good.   Duration of intervention: 3 minutes.        Psychotropic medication review  Previous Trials-  Adderall IR 5 mg x 1 day  Vyvanse    Current meds-  Adderall XR        Review of Systems       Review of Systems   Constitutional:  Negative for chills, fever and malaise/fatigue.   Respiratory:  Negative for cough and shortness of breath.    Cardiovascular:  Negative for chest pain and palpitations.   Gastrointestinal:  Negative for abdominal pain, diarrhea and vomiting.   Musculoskeletal:  Negative for falls and myalgias.   Skin:  Negative for rash.   Neurological:  Negative for tremors, seizures and headaches.   Psychiatric/Behavioral:          See HPI       Past Medical, Family and Social History: The patient's past medical, family and social history, allergies, current medications, past surgical history, and problem list have been reviewed and updated as appropriate within the electronic medical record.    Compliance: yes      Risk Parameters:  Patient reports no suicidal ideation  Patient reports no homicidal ideation  Patient reports no self-injurious behavior  Patient reports no violent behavior    Exam (detailed: at least 9 elements; comprehensive: all 15 elements)   Constitutional  Vitals:  Most recent vital signs, dated less than 90 days prior to this appointment, were reviewed.   There were no vitals filed for this visit.       General:  unremarkable, age appropriate, normal weight, well nourished, casually dressed, neatly groomed     Musculoskeletal  Muscle Strength/Tone:  not examined - STEVIE due to virtual visit   Gait & Station:  STEVIE due to virtual visit     Psychiatric      Appearance:  unremarkable, age appropriate, normal  weight, well nourished, casually dressed, neatly groomed   Behavior:  normal, friendly and cooperative, eye contact normal     Speech:  no latency; no press   Mood & Affect:  euthymic  congruent and appropriate   Thought Process:  normal and logical   Associations:  intact   Thought Content:  normal, no suicidality, no homicidality, delusions, or paranoia   Insight:  intact, has awareness of illness   Judgement: behavior is adequate to circumstances, age appropriate   Orientation:  grossly intact   Memory: intact for content of interview   Language: grossly intact   Attention Span & Concentration:  able to focus   Fund of Knowledge:  intact and appropriate to age and level of education     Medications:  Outpatient Encounter Medications as of 5/22/2023   Medication Sig Dispense Refill    dextroamphetamine-amphetamine (ADDERALL XR) 20 MG 24 hr capsule Take 1 capsule po q am prn inattention 30 capsule 0    dextroamphetamine-amphetamine (ADDERALL XR) 30 MG 24 hr capsule Take 1 capsule po q am prn inattention 30 capsule 0    [START ON 6/21/2023] dextroamphetamine-amphetamine (ADDERALL XR) 30 MG 24 hr capsule Take 1 capsule po q am prn inattention 30 capsule 0    [START ON 7/21/2023] dextroamphetamine-amphetamine (ADDERALL XR) 30 MG 24 hr capsule Take 1 capsule po q am prn inattention 30 capsule 0    TRI-LO-JERSEY 0.18/0.215/0.25 mg-25 mcg tablet Take 1 tablet by mouth.       No facility-administered encounter medications on file as of 5/22/2023.       Allergy:  Review of patient's allergies indicates:  No Known Allergies      Assessment and Diagnosis   Status/Progress: Based on the examination today, the patient's problem(s) is/are well controlled.  New problems have not been presented today.   Co-morbidities are not complicating management of the primary condition.  There are no active rule-out diagnoses for this patient at this time.         General Impression:       ICD-10-CM ICD-9-CM   1. Attention deficit  hyperactivity disorder (ADHD), combined type  F90.2 314.01       Intervention/Counseling/Treatment Plan     Medication Management:  Increase Adderall XR to 30 mg q am prn inattention  Labs: reviewed most recent  Stimulant: Discussed the risks of stimulants including addiction, tolerance, and possible withdrawal associated with stimulant use. Possible side effects including heart palpitations, restlessness, increased anxiety, decreased appetite, insomnia, and dry mouth were also discussed with the patient. Patient advised not to mix the medication with alcohol, and pt verbalized understanding.  Discussed informed consent, diagnosis, risks and benefits of proposed treatment vs alternative treatments vs no treatment, and potential side effects of these treatments (death, dependency, psychosis, lexie, aggression, HTN, ticks, MI, stroke, arrythmia, seizure, anaphylaxis or other allergic reactions, leukopenia, nervousness, anorexia, insomnia, tachycardia, palpitations, dizziness, BP changes, HR changes, visual disturbance, etc.). The risks and benefits of medication were discussed with the patient. The patient expresses understanding of the above and displays the capacity to agree with this treatment given said understanding. Patient also agrees that, currently, the benefits outweigh the risks and would like to pursue treatment at this time.  Prescription Monitoring Program queried.  The treatment plan and follow up plan were reviewed with the patient.  Discussed with patient informed consent, risks vs. benefits, alternative treatments, side effect profile and the inherent unpredictability of individual responses to these treatments. The patient expresses understanding of the above and displays the capacity to agree with this current plan and had no other questions.  Encouraged Patient to keep future appointments.   Take medications as prescribed and abstain from substance abuse.   Pt was told to present to ED or call 911  for SI/HI plan or intent, psychosis, or other psychiatric or medical emergency, and pt verbalized understanding.      Return to Clinic: 3 months, or sooner if needed      Face-to-face time with patient:  10 minutes  Total time:  15 minutes of total time spent on the encounter, which includes face to face time and non-face to face time preparing to see the patient (eg, review of tests), Obtaining and/or reviewing separately obtained history, Documenting clinical information in the electronic or other health record, Independently interpreting results (not separately reported) and communicating results to the patient/family/caregiver, or Care coordination (not separately reported).       Gail Farias, MSN, APRN, PMHNP-BC  [facility]

## 2023-06-28 ENCOUNTER — OFFICE VISIT (OUTPATIENT)
Dept: PSYCHIATRY | Facility: CLINIC | Age: 23
End: 2023-06-28
Payer: COMMERCIAL

## 2023-06-28 VITALS
DIASTOLIC BLOOD PRESSURE: 85 MMHG | HEART RATE: 96 BPM | WEIGHT: 126.19 LBS | BODY MASS INDEX: 21.66 KG/M2 | SYSTOLIC BLOOD PRESSURE: 127 MMHG

## 2023-06-28 DIAGNOSIS — F90.2 ATTENTION DEFICIT HYPERACTIVITY DISORDER (ADHD), COMBINED TYPE: ICD-10-CM

## 2023-06-28 PROCEDURE — 1160F RVW MEDS BY RX/DR IN RCRD: CPT | Mod: CPTII,S$GLB,, | Performed by: NURSE PRACTITIONER

## 2023-06-28 PROCEDURE — 99213 PR OFFICE/OUTPT VISIT, EST, LEVL III, 20-29 MIN: ICD-10-PCS | Mod: S$GLB,,, | Performed by: NURSE PRACTITIONER

## 2023-06-28 PROCEDURE — 3008F PR BODY MASS INDEX (BMI) DOCUMENTED: ICD-10-PCS | Mod: CPTII,S$GLB,, | Performed by: NURSE PRACTITIONER

## 2023-06-28 PROCEDURE — 3008F BODY MASS INDEX DOCD: CPT | Mod: CPTII,S$GLB,, | Performed by: NURSE PRACTITIONER

## 2023-06-28 PROCEDURE — 99213 OFFICE O/P EST LOW 20 MIN: CPT | Mod: S$GLB,,, | Performed by: NURSE PRACTITIONER

## 2023-06-28 PROCEDURE — 3079F PR MOST RECENT DIASTOLIC BLOOD PRESSURE 80-89 MM HG: ICD-10-PCS | Mod: CPTII,S$GLB,, | Performed by: NURSE PRACTITIONER

## 2023-06-28 PROCEDURE — 1160F PR REVIEW ALL MEDS BY PRESCRIBER/CLIN PHARMACIST DOCUMENTED: ICD-10-PCS | Mod: CPTII,S$GLB,, | Performed by: NURSE PRACTITIONER

## 2023-06-28 PROCEDURE — 99999 PR PBB SHADOW E&M-EST. PATIENT-LVL II: CPT | Mod: PBBFAC,,, | Performed by: NURSE PRACTITIONER

## 2023-06-28 PROCEDURE — 3074F SYST BP LT 130 MM HG: CPT | Mod: CPTII,S$GLB,, | Performed by: NURSE PRACTITIONER

## 2023-06-28 PROCEDURE — 3074F PR MOST RECENT SYSTOLIC BLOOD PRESSURE < 130 MM HG: ICD-10-PCS | Mod: CPTII,S$GLB,, | Performed by: NURSE PRACTITIONER

## 2023-06-28 PROCEDURE — 1159F PR MEDICATION LIST DOCUMENTED IN MEDICAL RECORD: ICD-10-PCS | Mod: CPTII,S$GLB,, | Performed by: NURSE PRACTITIONER

## 2023-06-28 PROCEDURE — 3079F DIAST BP 80-89 MM HG: CPT | Mod: CPTII,S$GLB,, | Performed by: NURSE PRACTITIONER

## 2023-06-28 PROCEDURE — 99999 PR PBB SHADOW E&M-EST. PATIENT-LVL II: ICD-10-PCS | Mod: PBBFAC,,, | Performed by: NURSE PRACTITIONER

## 2023-06-28 PROCEDURE — 1159F MED LIST DOCD IN RCRD: CPT | Mod: CPTII,S$GLB,, | Performed by: NURSE PRACTITIONER

## 2023-06-28 RX ORDER — DEXTROAMPHETAMINE SACCHARATE, AMPHETAMINE ASPARTATE MONOHYDRATE, DEXTROAMPHETAMINE SULFATE AND AMPHETAMINE SULFATE 7.5; 7.5; 7.5; 7.5 MG/1; MG/1; MG/1; MG/1
CAPSULE, EXTENDED RELEASE ORAL
Qty: 30 CAPSULE | Refills: 0 | Status: SHIPPED | OUTPATIENT
Start: 2023-09-19 | End: 2023-12-11 | Stop reason: SDUPTHER

## 2023-06-28 RX ORDER — DEXTROAMPHETAMINE SACCHARATE, AMPHETAMINE ASPARTATE MONOHYDRATE, DEXTROAMPHETAMINE SULFATE AND AMPHETAMINE SULFATE 7.5; 7.5; 7.5; 7.5 MG/1; MG/1; MG/1; MG/1
CAPSULE, EXTENDED RELEASE ORAL
Qty: 30 CAPSULE | Refills: 0 | Status: SHIPPED | OUTPATIENT
Start: 2023-08-20 | End: 2023-10-26 | Stop reason: SDUPTHER

## 2023-06-28 NOTE — PROGRESS NOTES
6/28/2023 12:56 PM  Erika Rodriguez  2000  [id number]    Outpatient Psychiatry Follow-Up Visit (MD/NP)     Chief Complaint:  Erika Rodriguez, a 23 y.o. female,who presents today for follow up of ADHD.  Met with patient.      Interval History/Subjective Report/Content of Current Session:     Pt is a 23 y.o. female with a hx of ADHD.    She reports the Adderall XR 30 mg is working well. Finds it wears off before she's like it to and she feels fatigued at the end of the day. Takes it at 6:30 am. Discussed that she could take it a little later - around 7:00/8:00 am to help it last longer. Overall, she states she is happy with the medication.    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 She takes her certifying exam on August 8th and starts her job on August 14th. She is nervous about the test and is looking forward to starting her new job.    Sleeping well. Mood is good.    ASEs from psych meds: denies    Pt denies anorexia, tremor, tic, insomnia, headache, GI upset, CP, and heart palpitations. Most recent documented VS reviewed. Pt does take drug holidays from the stimulant medication. Takes the medication Mon-Fri while she is in classes and clinicals.    Pt denies recurrent thoughts of death and denies SI/HI. Denies any sxs of lexie. Denies AVH, paranoia and delusions. No objective s/sx of psychosis, lexie, or hypomania.         Psychotherapy:  Target symptoms: distractability, lack of focus  Why chosen therapy is appropriate versus another modality: relevant to diagnosis, patient responds to this modality  Outcome monitoring methods: self-report, observation  Therapeutic intervention type: supportive psychotherapy  Topics discussed/themes: building skills sets for symptom management  The patient's response to the intervention is accepting. The patient's progress toward treatment goals is good.   Duration  of intervention: 3 minutes.        Psychotropic medication review  Previous Trials-  Adderall IR 5 mg x 1 day  Vyvanse    Current meds-  Adderall XR        Review of Systems       Review of Systems   Constitutional:  Negative for chills, fever and malaise/fatigue.   Respiratory:  Negative for cough and shortness of breath.    Cardiovascular:  Negative for chest pain and palpitations.   Gastrointestinal:  Negative for abdominal pain, diarrhea and vomiting.   Musculoskeletal:  Negative for falls and myalgias.   Skin:  Negative for rash.   Neurological:  Negative for tremors, seizures and headaches.   Psychiatric/Behavioral:          See HPI       Past Medical, Family and Social History: The patient's past medical, family and social history, allergies, current medications, past surgical history, and problem list have been reviewed and updated as appropriate within the electronic medical record.    Compliance: yes      Risk Parameters:  Patient reports no suicidal ideation  Patient reports no homicidal ideation  Patient reports no self-injurious behavior  Patient reports no violent behavior    Exam (detailed: at least 9 elements; comprehensive: all 15 elements)   Constitutional  Vitals:  Most recent vital signs, dated less than 90 days prior to this appointment, were reviewed.   Vitals:    06/28/23 1352   BP: 127/85   Pulse: 96   Weight: 57.2 kg (126 lb 3.4 oz)            Musculoskeletal  Muscle Strength/Tone:  no dyskinesia, no dystonia, no tremor, no tic   Gait & Station:  non-ataxic     Psychiatric      Appearance:  unremarkable, age appropriate, normal weight, well nourished, casually dressed, neatly groomed   Behavior:  normal, friendly and cooperative, eye contact normal     Speech:  no latency; no press   Mood & Affect:  euthymic  congruent and appropriate   Thought Process:  normal and logical   Associations:  intact   Thought Content:  normal, no suicidality, no homicidality, delusions, or paranoia   Insight:   intact, has awareness of illness   Judgement: behavior is adequate to circumstances, age appropriate   Orientation:  grossly intact   Memory: intact for content of interview   Language: grossly intact   Attention Span & Concentration:  able to focus   Fund of Knowledge:  intact and appropriate to age and level of education     Medications:  Outpatient Encounter Medications as of 6/28/2023   Medication Sig Dispense Refill    dextroamphetamine-amphetamine (ADDERALL XR) 20 MG 24 hr capsule Take 1 capsule po q am prn inattention 30 capsule 0    dextroamphetamine-amphetamine (ADDERALL XR) 30 MG 24 hr capsule Take 1 capsule po q am prn inattention 30 capsule 0    dextroamphetamine-amphetamine (ADDERALL XR) 30 MG 24 hr capsule Take 1 capsule po q am prn inattention 30 capsule 0    [START ON 7/21/2023] dextroamphetamine-amphetamine (ADDERALL XR) 30 MG 24 hr capsule Take 1 capsule po q am prn inattention 30 capsule 0    TRI-LO-JERSEY 0.18/0.215/0.25 mg-25 mcg tablet Take 1 tablet by mouth.       No facility-administered encounter medications on file as of 6/28/2023.       Allergy:  Review of patient's allergies indicates:  No Known Allergies      Assessment and Diagnosis   Status/Progress: Based on the examination today, the patient's problem(s) is/are well controlled.  New problems have not been presented today.   Co-morbidities are not complicating management of the primary condition.  There are no active rule-out diagnoses for this patient at this time.         General Impression:       ICD-10-CM ICD-9-CM   1. Attention deficit hyperactivity disorder (ADHD), combined type  F90.2 314.01       Intervention/Counseling/Treatment Plan     Medication Management:  Continue Adderall XR 30 mg q am prn inattention  Labs: reviewed most recent  Stimulant: Discussed the risks of stimulants including addiction, tolerance, and possible withdrawal associated with stimulant use. Possible side effects including heart palpitations,  restlessness, increased anxiety, decreased appetite, insomnia, and dry mouth were also discussed with the patient. Patient advised not to mix the medication with alcohol, and pt verbalized understanding.  Discussed informed consent, diagnosis, risks and benefits of proposed treatment vs alternative treatments vs no treatment, and potential side effects of these treatments (death, dependency, psychosis, lexie, aggression, HTN, ticks, MI, stroke, arrythmia, seizure, anaphylaxis or other allergic reactions, leukopenia, nervousness, anorexia, insomnia, tachycardia, palpitations, dizziness, BP changes, HR changes, visual disturbance, etc.). The risks and benefits of medication were discussed with the patient. The patient expresses understanding of the above and displays the capacity to agree with this treatment given said understanding. Patient also agrees that, currently, the benefits outweigh the risks and would like to pursue treatment at this time.  Prescription Monitoring Program queried.  The treatment plan and follow up plan were reviewed with the patient.  Discussed with patient informed consent, risks vs. benefits, alternative treatments, side effect profile and the inherent unpredictability of individual responses to these treatments. The patient expresses understanding of the above and displays the capacity to agree with this current plan and had no other questions.  Encouraged Patient to keep future appointments.   Take medications as prescribed and abstain from substance abuse.   Pt was told to present to ED or call 911 for SI/HI plan or intent, psychosis, or other psychiatric or medical emergency, and pt verbalized understanding.      Return to Clinic: 3 months, or sooner if needed      Face-to-face time with patient:  12 minutes  Total time:  20 minutes of total time spent on the encounter, which includes face to face time and non-face to face time preparing to see the patient (eg, review of tests),  Obtaining and/or reviewing separately obtained history, Documenting clinical information in the electronic or other health record, Independently interpreting results (not separately reported) and communicating results to the patient/family/caregiver, or Care coordination (not separately reported).       Gail Farias, MSN, APRN, PMHNP-BC  [facility]

## 2023-07-19 ENCOUNTER — LAB VISIT (OUTPATIENT)
Dept: LAB | Facility: HOSPITAL | Age: 23
End: 2023-07-19
Attending: NURSE PRACTITIONER
Payer: COMMERCIAL

## 2023-07-19 DIAGNOSIS — Z11.1 SCREENING FOR TUBERCULOSIS: ICD-10-CM

## 2023-07-19 PROCEDURE — 86480 TB TEST CELL IMMUN MEASURE: CPT | Performed by: NURSE PRACTITIONER

## 2023-07-20 LAB
GAMMA INTERFERON BACKGROUND BLD IA-ACNC: 0.02 IU/ML
M TB IFN-G CD4+ BCKGRND COR BLD-ACNC: 0 IU/ML
M TB IFN-G CD4+ BCKGRND COR BLD-ACNC: 0 IU/ML
MITOGEN IGNF BCKGRD COR BLD-ACNC: 6.57 IU/ML
TB GOLD PLUS: NEGATIVE

## 2023-07-26 ENCOUNTER — PATIENT OUTREACH (OUTPATIENT)
Dept: ADMINISTRATIVE | Facility: HOSPITAL | Age: 23
End: 2023-07-26
Payer: COMMERCIAL

## 2023-08-08 LAB
CHLAMYDIA: NEGATIVE
CHLAMYDIA: NEGATIVE
PAP RECOMMENDATION EXT: NORMAL

## 2023-10-04 ENCOUNTER — PATIENT OUTREACH (OUTPATIENT)
Dept: ADMINISTRATIVE | Facility: HOSPITAL | Age: 23
End: 2023-10-04
Payer: COMMERCIAL

## 2023-10-26 DIAGNOSIS — F90.2 ATTENTION DEFICIT HYPERACTIVITY DISORDER (ADHD), COMBINED TYPE: ICD-10-CM

## 2023-10-27 RX ORDER — DEXTROAMPHETAMINE SACCHARATE, AMPHETAMINE ASPARTATE MONOHYDRATE, DEXTROAMPHETAMINE SULFATE AND AMPHETAMINE SULFATE 7.5; 7.5; 7.5; 7.5 MG/1; MG/1; MG/1; MG/1
CAPSULE, EXTENDED RELEASE ORAL
Qty: 30 CAPSULE | Refills: 0 | Status: SHIPPED | OUTPATIENT
Start: 2023-10-27 | End: 2023-12-11

## 2023-12-11 ENCOUNTER — OFFICE VISIT (OUTPATIENT)
Dept: PSYCHIATRY | Facility: CLINIC | Age: 23
End: 2023-12-11
Payer: COMMERCIAL

## 2023-12-11 DIAGNOSIS — F90.2 ATTENTION DEFICIT HYPERACTIVITY DISORDER (ADHD), COMBINED TYPE: Primary | ICD-10-CM

## 2023-12-11 PROCEDURE — 1160F PR REVIEW ALL MEDS BY PRESCRIBER/CLIN PHARMACIST DOCUMENTED: ICD-10-PCS | Mod: CPTII,95,, | Performed by: NURSE PRACTITIONER

## 2023-12-11 PROCEDURE — 99213 OFFICE O/P EST LOW 20 MIN: CPT | Mod: 95,,, | Performed by: NURSE PRACTITIONER

## 2023-12-11 PROCEDURE — 1159F PR MEDICATION LIST DOCUMENTED IN MEDICAL RECORD: ICD-10-PCS | Mod: CPTII,95,, | Performed by: NURSE PRACTITIONER

## 2023-12-11 PROCEDURE — 99213 PR OFFICE/OUTPT VISIT, EST, LEVL III, 20-29 MIN: ICD-10-PCS | Mod: 95,,, | Performed by: NURSE PRACTITIONER

## 2023-12-11 PROCEDURE — 1160F RVW MEDS BY RX/DR IN RCRD: CPT | Mod: CPTII,95,, | Performed by: NURSE PRACTITIONER

## 2023-12-11 PROCEDURE — 1159F MED LIST DOCD IN RCRD: CPT | Mod: CPTII,95,, | Performed by: NURSE PRACTITIONER

## 2023-12-11 RX ORDER — DEXTROAMPHETAMINE SACCHARATE, AMPHETAMINE ASPARTATE MONOHYDRATE, DEXTROAMPHETAMINE SULFATE AND AMPHETAMINE SULFATE 7.5; 7.5; 7.5; 7.5 MG/1; MG/1; MG/1; MG/1
CAPSULE, EXTENDED RELEASE ORAL
Qty: 30 CAPSULE | Refills: 0 | Status: SHIPPED | OUTPATIENT
Start: 2024-02-09

## 2023-12-11 RX ORDER — DEXTROAMPHETAMINE SACCHARATE, AMPHETAMINE ASPARTATE MONOHYDRATE, DEXTROAMPHETAMINE SULFATE AND AMPHETAMINE SULFATE 7.5; 7.5; 7.5; 7.5 MG/1; MG/1; MG/1; MG/1
CAPSULE, EXTENDED RELEASE ORAL
Qty: 30 CAPSULE | Refills: 0 | Status: SHIPPED | OUTPATIENT
Start: 2024-01-10

## 2023-12-11 RX ORDER — DEXTROAMPHETAMINE SACCHARATE, AMPHETAMINE ASPARTATE MONOHYDRATE, DEXTROAMPHETAMINE SULFATE AND AMPHETAMINE SULFATE 7.5; 7.5; 7.5; 7.5 MG/1; MG/1; MG/1; MG/1
CAPSULE, EXTENDED RELEASE ORAL
Qty: 30 CAPSULE | Refills: 0 | Status: SHIPPED | OUTPATIENT
Start: 2023-12-11

## 2023-12-11 NOTE — PROGRESS NOTES
12/11/2023 12:56 PM  Erika Rodriguez  2000  [id number]    Outpatient Psychiatry Follow-Up Visit (MD/NP) - Telemedicine Visit      The patient location is: Patient reported that their location at the time of this visit was in the [facility]       Visit type: audiovisual    Each patient to whom he or she provides medical services by telemedicine is:  (1) informed of the relationship between the physician and patient and the respective role of any other health care provider with respect to management of the patient; and (2) notified that he or she may decline to receive medical services by telemedicine and may withdraw from such care at any time.      Chief Complaint:  Erika Rodriguez, a 23 y.o. female,who presents today for follow up of ADHD.  Met with patient.      Interval History/Subjective Report/Content of Current Session:     Pt is a 23 y.o. female with a hx of ADHD.    She reports the Adderall XR 30 mg is working well. States she is currently at work. She stepped out to take this appt, but she has to get back.    Sleeping well. Mood is good.    ASEs from psych meds: denies    Pt denies anorexia, tremor, tic, insomnia, headache, GI upset, CP, and heart palpitations. Most recent documented VS reviewed. Pt does take drug holidays from the stimulant medication. Takes the medication Mon-Fri while she is in classes and clinicals.    Pt denies recurrent thoughts of death and denies SI/HI. Denies any sxs of lexie. Denies AVH, paranoia and delusions. No objective s/sx of psychosis, lexie, or hypomania.         Psychotherapy:  Target symptoms: distractability, lack of focus  Why chosen therapy is appropriate versus another modality: relevant to diagnosis, patient responds to this modality  Outcome monitoring methods: self-report, observation  Therapeutic intervention type: supportive psychotherapy  Topics discussed/themes: building skills sets for symptom management  The patient's response to the intervention is  accepting. The patient's progress toward treatment goals is good.   Duration of intervention: 2 minutes.        Psychotropic medication review  Previous Trials-  Adderall IR 5 mg x 1 day  Vyvanse    Current meds-  Adderall XR        Review of Systems       Review of Systems   Constitutional:  Negative for chills, fever and malaise/fatigue.   Respiratory:  Negative for cough and shortness of breath.    Cardiovascular:  Negative for chest pain and palpitations.   Gastrointestinal:  Negative for abdominal pain, diarrhea and vomiting.   Musculoskeletal:  Negative for falls and myalgias.   Skin:  Negative for rash.   Neurological:  Negative for tremors, seizures and headaches.   Psychiatric/Behavioral:          See HPI         Past Medical, Family and Social History: The patient's past medical, family and social history, allergies, current medications, past surgical history, and problem list have been reviewed and updated as appropriate within the electronic medical record.    Compliance: yes      Risk Parameters:  Patient reports no suicidal ideation  Patient reports no homicidal ideation  Patient reports no self-injurious behavior  Patient reports no violent behavior    Exam (detailed: at least 9 elements; comprehensive: all 15 elements)   Constitutional  Vitals:  Most recent vital signs, dated greater than 90 days prior to this appointment, were reviewed.   There were no vitals filed for this visit.           Musculoskeletal  Muscle Strength/Tone:  not examined - STEVIE due to virtual visit   Gait & Station:  STEVIE due to virtual visit     Psychiatric      Appearance:  unremarkable, age appropriate, normal weight, well nourished, casually dressed, neatly groomed   Behavior:  normal, friendly and cooperative, eye contact normal     Speech:  no latency; no press   Mood & Affect:  euthymic  congruent and appropriate   Thought Process:  normal and logical   Associations:  intact   Thought Content:  normal, no suicidality, no  homicidality, delusions, or paranoia   Insight:  intact, has awareness of illness   Judgement: behavior is adequate to circumstances, age appropriate   Orientation:  grossly intact   Memory: intact for content of interview   Language: grossly intact   Attention Span & Concentration:  able to focus   Fund of Knowledge:  intact and appropriate to age and level of education     Medications:  Outpatient Encounter Medications as of 12/11/2023   Medication Sig Dispense Refill    dextroamphetamine-amphetamine (ADDERALL XR) 20 MG 24 hr capsule Take 1 capsule po q am prn inattention 30 capsule 0    dextroamphetamine-amphetamine (ADDERALL XR) 30 MG 24 hr capsule Take 1 capsule po q am prn inattention 30 capsule 0    dextroamphetamine-amphetamine (ADDERALL XR) 30 MG 24 hr capsule Take 1 capsule po q am prn inattention 30 capsule 0    dextroamphetamine-amphetamine (ADDERALL XR) 30 MG 24 hr capsule Take 1 capsule po q am prn inattention 30 capsule 0    dextroamphetamine-amphetamine (ADDERALL XR) 30 MG 24 hr capsule Take 1 capsule po q am prn inattention 30 capsule 0    TRI-LO-[place] 0.18/0.215/0.25 mg-25 mcg tablet Take 1 tablet by mouth.       No facility-administered encounter medications on file as of 12/11/2023.       Allergy:  Review of patient's allergies indicates:  No Known Allergies      Assessment and Diagnosis   Status/Progress: Based on the examination today, the patient's problem(s) is/are well controlled.  New problems have not been presented today.   Co-morbidities are not complicating management of the primary condition.  There are no active rule-out diagnoses for this patient at this time.         General Impression:       ICD-10-CM ICD-9-CM   1. Attention deficit hyperactivity disorder (ADHD), combined type  F90.2 314.01       Intervention/Counseling/Treatment Plan     Medication Management:  Continue Adderall XR 30 mg q am prn inattention  Labs: reviewed most recent  Stimulant: Discussed the risks of stimulants  including addiction, tolerance, and possible withdrawal associated with stimulant use. Possible side effects including heart palpitations, restlessness, increased anxiety, decreased appetite, insomnia, and dry mouth were also discussed with the patient. Patient advised not to mix the medication with alcohol, and pt verbalized understanding.  Discussed informed consent, diagnosis, risks and benefits of proposed treatment vs alternative treatments vs no treatment, and potential side effects of these treatments (death, dependency, psychosis, lexie, aggression, HTN, ticks, MI, stroke, arrythmia, seizure, anaphylaxis or other allergic reactions, leukopenia, nervousness, anorexia, insomnia, tachycardia, palpitations, dizziness, BP changes, HR changes, visual disturbance, etc.). The risks and benefits of medication were discussed with the patient. The patient expresses understanding of the above and displays the capacity to agree with this treatment given said understanding. Patient also agrees that, currently, the benefits outweigh the risks and would like to pursue treatment at this time.  Prescription Monitoring Program queried.  The treatment plan and follow up plan were reviewed with the patient.  Discussed with patient informed consent, risks vs. benefits, alternative treatments, side effect profile and the inherent unpredictability of individual responses to these treatments. The patient expresses understanding of the above and displays the capacity to agree with this current plan and had no other questions.  Encouraged Patient to keep future appointments.   Take medications as prescribed and abstain from substance abuse.   Pt was told to present to ED or call 911 for SI/HI plan or intent, psychosis, or other psychiatric or medical emergency, and pt verbalized understanding.      Return to Clinic: 3 months, or sooner if needed      Face-to-face time with patient:  6 minutes  Total time:  10 minutes of total time  spent on the encounter, which includes face to face time and non-face to face time preparing to see the patient (eg, review of tests), Obtaining and/or reviewing separately obtained history, Documenting clinical information in the electronic or other health record, Independently interpreting results (not separately reported) and communicating results to the patient/family/caregiver, or Care coordination (not separately reported).       Gail Farias, MSN, APRN, PMHNP-BC  [facility]

## 2025-02-20 ENCOUNTER — TELEPHONE (OUTPATIENT)
Dept: FAMILY MEDICINE | Facility: CLINIC | Age: 25
End: 2025-02-20
Payer: COMMERCIAL

## 2025-02-20 NOTE — TELEPHONE ENCOUNTER
----- Message from Dante Kaplan NP sent at 2/19/2025 12:56 PM CST -----  Regarding: FW: Patient call back  Please place orders for the shots. I will cosign.  ----- Message -----  From: Porsha Trivedi  Sent: 2/19/2025  11:20 AM CST  To: [facility] Staff  Subject: Patient call back                                .Type: Patient Call BackWho called:self What is the request in detail:asking for a call back to schedule 2 shots- Measles and Varicella (booster shots)Can the clinic reply by [facility]?no Would the patient rather a call back or a response via [facility]? [facility]:.[phone] Information:

## 2025-04-30 ENCOUNTER — CLINICAL SUPPORT (OUTPATIENT)
Dept: OTHER | Facility: CLINIC | Age: 25
End: 2025-04-30

## 2025-04-30 DIAGNOSIS — Z00.8 ENCOUNTER FOR OTHER GENERAL EXAMINATION: ICD-10-CM

## 2025-05-01 VITALS
SYSTOLIC BLOOD PRESSURE: 112 MMHG | HEIGHT: 63 IN | WEIGHT: 138 LBS | BODY MASS INDEX: 24.45 KG/M2 | DIASTOLIC BLOOD PRESSURE: 80 MMHG

## 2025-05-01 LAB
GLUCOSE SERPL-MCNC: 90 MG/DL (ref 60–140)
HDLC SERPL-MCNC: 52 MG/DL
POC CHOLESTEROL, LDL (DOCK): 100 MG/DL
POC CHOLESTEROL, TOTAL: 176 MG/DL
TRIGL SERPL-MCNC: 139 MG/DL

## 2025-06-05 ENCOUNTER — OFFICE VISIT (OUTPATIENT)
Dept: FAMILY MEDICINE | Facility: CLINIC | Age: 25
End: 2025-06-05
Payer: COMMERCIAL

## 2025-06-05 ENCOUNTER — LAB VISIT (OUTPATIENT)
Dept: LAB | Facility: HOSPITAL | Age: 25
End: 2025-06-05
Attending: FAMILY MEDICINE
Payer: COMMERCIAL

## 2025-06-05 VITALS
WEIGHT: 142.63 LBS | TEMPERATURE: 98 F | DIASTOLIC BLOOD PRESSURE: 74 MMHG | HEIGHT: 63 IN | BODY MASS INDEX: 25.27 KG/M2 | SYSTOLIC BLOOD PRESSURE: 102 MMHG | HEART RATE: 80 BPM | RESPIRATION RATE: 18 BRPM | OXYGEN SATURATION: 98 %

## 2025-06-05 DIAGNOSIS — Z00.00 HEALTH MAINTENANCE EXAMINATION: Primary | ICD-10-CM

## 2025-06-05 DIAGNOSIS — Z11.8 SCREENING FOR CHLAMYDIAL DISEASE: ICD-10-CM

## 2025-06-05 DIAGNOSIS — Z00.00 HEALTH MAINTENANCE EXAMINATION: ICD-10-CM

## 2025-06-05 DIAGNOSIS — D50.9 IRON DEFICIENCY ANEMIA, UNSPECIFIED IRON DEFICIENCY ANEMIA TYPE: ICD-10-CM

## 2025-06-05 DIAGNOSIS — R79.89 LOW VITAMIN B12 LEVEL: ICD-10-CM

## 2025-06-05 DIAGNOSIS — R41.89 BRAIN FOG: ICD-10-CM

## 2025-06-05 PROCEDURE — 1160F RVW MEDS BY RX/DR IN RCRD: CPT | Mod: CPTII,S$GLB,, | Performed by: FAMILY MEDICINE

## 2025-06-05 PROCEDURE — 1159F MED LIST DOCD IN RCRD: CPT | Mod: CPTII,S$GLB,, | Performed by: FAMILY MEDICINE

## 2025-06-05 PROCEDURE — 3074F SYST BP LT 130 MM HG: CPT | Mod: CPTII,S$GLB,, | Performed by: FAMILY MEDICINE

## 2025-06-05 PROCEDURE — 99395 PREV VISIT EST AGE 18-39: CPT | Mod: S$GLB,,, | Performed by: FAMILY MEDICINE

## 2025-06-05 PROCEDURE — 87491 CHLMYD TRACH DNA AMP PROBE: CPT

## 2025-06-05 PROCEDURE — 3008F BODY MASS INDEX DOCD: CPT | Mod: CPTII,S$GLB,, | Performed by: FAMILY MEDICINE

## 2025-06-05 PROCEDURE — 99999 PR PBB SHADOW E&M-EST. PATIENT-LVL IV: CPT | Mod: PBBFAC,,, | Performed by: FAMILY MEDICINE

## 2025-06-05 PROCEDURE — 3078F DIAST BP <80 MM HG: CPT | Mod: CPTII,S$GLB,, | Performed by: FAMILY MEDICINE

## 2025-06-05 NOTE — PROGRESS NOTES
Patient Name: Erika Rodriguez    : 2000  MRN: [id number]      Subjective:     Patient ID: Erika is a 24 y.o. female    Chief Complaint:  Establish Care    History of Present Illness    Erika presents today for establishing care with multiple concerns including fatigue and anemia.    She has beta thalassemia trait diagnosed during pregnancy at approximately 6 months gestation, requiring iron infusion. She reports severe fatigue that could cause her to fall asleep while working. She has a history of less severe anemia prior to pregnancy. She experiences frequent palpitations and describes a sensation of blood draining from her head, likening it to feeling as if she is being hung upside down.    She has a history of ADHD, previously managed with Adderall which was discontinued during pregnancy. She reports increased anxiety since delivery. She describes mental fog and difficulty focusing at work with decreased concentration compared to baseline. She denies interest in restarting ADHD medications.    She experiences severe migraines occurring once monthly.    She underwent  at 37 weeks gestation due to fetal tachycardia with heart rate in 200s and umbilical cord wrapped around fetus's neck.    She has regular menstrual cycles every 4 weeks with heavy flow during first two days requiring approximately 5 pad/tampon changes per day, followed by normal flow.     She has history of recurrent urinary tract infections during high school years that persisted for several years. She denies any current urinary symptoms.    Notable for paternal grandfather with colon cancer in late 60s or 70s, maternal great aunt with lung cancer and history of heavy smoking, and maternal grandfather with suspected small cell lung cancer with COPD. Cardiovascular history includes paternal uncle with widowmaker heart attack, paternal grandmother who  of heart complications, and father with hypertension.    She is taking  "Tri-lo-sprintec birth control.      ROS:  General: -fever, -chills, +fatigue, -weight gain, -weight loss  Eyes: -vision changes, -redness, -discharge  ENT: -ear pain, -nasal congestion, -sore throat  Cardiovascular: -chest pain, +palpitations, -lower extremity edema  Respiratory: -cough, -shortness of breath  Gastrointestinal: -abdominal pain, -nausea, -vomiting, -diarrhea, -constipation, -blood in stool  Genitourinary: -dysuria, -hematuria, -frequency  Musculoskeletal: -joint pain, -muscle pain  Skin: -rash, -lesion  Neurological: -headache, -dizziness, -numbness, -tingling, +lack of focus/concentration, +confusion or disorientation, +migraines  Psychiatric: +anxiety, -depression, -sleep difficulty  Female Genitourinary: +prolonged or heavy bleeding         Objective:   /74 (BP Location: Right arm, Patient Position: Sitting)   Pulse 80   Temp 98 °F (36.7 °C) (Oral)   Resp 18   Ht 5' 3" (1.6 m)   Wt 64.7 kg (142 lb 10.2 oz)   LMP 06/01/2025   SpO2 98%   BMI 25.27 kg/m²     Physical Exam  Vitals reviewed.   Constitutional:       General: She is not in acute distress.  HENT:      Head: Normocephalic and atraumatic.      Right Ear: Ear canal and external ear normal.      Left Ear: Ear canal and external ear normal.      Nose: Nose normal.      Mouth/Throat:      Mouth: Mucous membranes are moist.      Pharynx: No oropharyngeal exudate or posterior oropharyngeal erythema.   Eyes:      Extraocular Movements: Extraocular movements intact.      Conjunctiva/sclera: Conjunctivae normal.      Pupils: Pupils are equal, round, and reactive to light.   Cardiovascular:      Rate and Rhythm: Normal rate and regular rhythm.      Pulses: Normal pulses.      Heart sounds: Normal heart sounds.   Pulmonary:      Effort: Pulmonary effort is normal. No respiratory distress.      Breath sounds: No wheezing or rales.   Abdominal:      General: Abdomen is flat. Bowel sounds are normal. There is no distension.      Palpations: " Abdomen is soft.      Tenderness: There is no abdominal tenderness. There is no guarding.   Musculoskeletal:      Cervical back: Normal range of motion. No rigidity or tenderness.   Lymphadenopathy:      Cervical: No cervical adenopathy.   Skin:     General: Skin is warm.      Capillary Refill: Capillary refill takes less than 2 seconds.   Neurological:      Mental Status: She is alert and oriented to person, place, and time.      Cranial Nerves: No cranial nerve deficit.      Sensory: No sensory deficit.   Psychiatric:         Mood and Affect: Mood normal.         Behavior: Behavior normal.        Assessment        ICD-10-CM ICD-9-CM   1. Health maintenance examination  Z00.00 V70.0   2. Iron deficiency anemia, unspecified iron deficiency anemia type  D50.9 280.9   3. Low vitamin B12 level  R79.89 790.6   4. Screening for chlamydial disease  Z11.8 V73.98   5. Brain fog  R41.89 799.59         Plan:   Assessment & Plan           1. Health maintenance examination  -     Comprehensive Metabolic Panel; Future; Expected date: 06/05/2025  -     CBC Auto Differential; Future; Expected date: 06/05/2025  -     Lipid Panel; Future; Expected date: 06/05/2025  -     Iron and TIBC; Future; Expected date: 06/05/2025  -     Ferritin; Future; Expected date: 06/05/2025  -     Vitamin B12; Future; Expected date: 06/05/2025  -     Folate; Future; Expected date: 06/05/2025  -     TSH; Future; Expected date: 06/05/2025  -     C. trachomatis/N. gonorrhoeae by AMP DNA; Future; Expected date: 06/05/2025  Age appropriate screenings, vaccinations, and recommendations reviewed and discussed.  Appropriate orders placed and previous results reviewed.    2. Iron deficiency anemia, unspecified iron deficiency anemia type  -     CBC Auto Differential; Future; Expected date: 06/05/2025  -     Iron and TIBC; Future; Expected date: 06/05/2025  -     Ferritin; Future; Expected date: 06/05/2025  -     Folate; Future; Expected date: 06/05/2025  -      TSH; Future; Expected date: 06/05/2025  Check iron studies.    3. Low vitamin B12 level  -     Vitamin B12; Future; Expected date: 06/05/2025  -     Folate; Future; Expected date: 06/05/2025  -     TSH; Future; Expected date: 06/05/2025  Previous B12 low, will recheck    4. Screening for chlamydial disease  -     C. trachomatis/N. gonorrhoeae by AMP DNA; Future; Expected date: 06/05/2025  Screen for chlamydia as per USPSTF guidelines.    5. Brain fog  -     CBC Auto Differential; Future; Expected date: 06/05/2025  -     Iron and TIBC; Future; Expected date: 06/05/2025  -     Ferritin; Future; Expected date: 06/05/2025  -     Vitamin B12; Future; Expected date: 06/05/2025  -     Folate; Future; Expected date: 06/05/2025  -     TSH; Future; Expected date: 06/05/2025  Check labs for potential causes           -Jordan Díaz Jr., MD, AAHIVS      This note was generated with the assistance of ambient listening technology. Verbal consent was obtained by the patient and accompanying visitor(s) for the recording of patient appointment to facilitate this note. I attest to having reviewed and edited the generated note for accuracy, though some syntax or spelling errors may persist. Please contact the author of this note for any clarification.      There are no Patient Instructions on file for this visit.      No follow-ups on file.   Future Appointments   Date Time Provider Department Center   6/12/2025  1:00 PM Jordan Díaz Jr., MD [facility]

## 2025-06-08 LAB
C TRACH DNA SPEC QL NAA+PROBE: NOT DETECTED
CTGC SOURCE (OHS) ORD-325: NORMAL
N GONORRHOEA DNA UR QL NAA+PROBE: NOT DETECTED

## 2025-06-09 ENCOUNTER — RESULTS FOLLOW-UP (OUTPATIENT)
Dept: FAMILY MEDICINE | Facility: CLINIC | Age: 25
End: 2025-06-09

## 2025-06-12 ENCOUNTER — OFFICE VISIT (OUTPATIENT)
Dept: FAMILY MEDICINE | Facility: CLINIC | Age: 25
End: 2025-06-12
Payer: COMMERCIAL

## 2025-06-12 DIAGNOSIS — R41.89 BRAIN FOG: ICD-10-CM

## 2025-06-12 DIAGNOSIS — D50.9 MICROCYTIC ANEMIA: Primary | ICD-10-CM

## 2025-06-12 DIAGNOSIS — R53.83 FATIGUE, UNSPECIFIED TYPE: ICD-10-CM

## 2025-06-12 PROCEDURE — 98005 SYNCH AUDIO-VIDEO EST LOW 20: CPT | Mod: 95,,, | Performed by: FAMILY MEDICINE

## 2025-06-12 PROCEDURE — 1159F MED LIST DOCD IN RCRD: CPT | Mod: CPTII,95,, | Performed by: FAMILY MEDICINE

## 2025-06-12 PROCEDURE — 1160F RVW MEDS BY RX/DR IN RCRD: CPT | Mod: CPTII,95,, | Performed by: FAMILY MEDICINE

## 2025-06-12 NOTE — PROGRESS NOTES
The patient location is: [place], [place]  The chief complaint leading to consultation is: Results    Visit type: audiovisual    Face to Face time with patient: 15 minutes  20 minutes of total time spent on the encounter, which includes face to face time and non-face to face time preparing to see the patient (eg, review of tests), Obtaining and/or reviewing separately obtained history, Documenting clinical information in the electronic or other health record, Independently interpreting results (not separately reported) and communicating results to the patient/family/caregiver, or Care coordination (not separately reported).         Each patient to whom he or she provides medical services by telemedicine is:  (1) informed of the relationship between the physician and patient and the respective role of any other health care provider with respect to management of the patient; and (2) notified that he or she may decline to receive medical services by telemedicine and may withdraw from such care at any time.    Notes:       Patient Name: Erika Rodriguez    : 2000  MRN: [id number]      Subjective:     Patient ID: Erika is a 25 y.o. female    Chief Complaint:  No chief complaint on file.    History of Present Illness    Erika presents today for follow up of lab results    She reports progressively worsening fatigue and extreme tiredness despite getting adequate sleep. She experiences brain fog with associated forgetfulness, leading to mistakes and forgetting important information. She feels persistently tired as if she had not slept at all. She denies any associated anxiety.    She reports sleeping well through the night with one awakening due to infant care.    MCV was 63, indicating small RBCs.      ROS:  General: -fever, -chills, +fatigue, -weight gain, -weight loss, +difficulty staying asleep  Eyes: -vision changes, -redness, -discharge  ENT: -ear pain, -nasal congestion, -sore throat  Cardiovascular: -chest  pain, -palpitations, -lower extremity edema  Respiratory: -cough, -shortness of breath  Gastrointestinal: -abdominal pain, -nausea, -vomiting, -diarrhea, -constipation, -blood in stool  Genitourinary: -dysuria, -hematuria, -frequency  Musculoskeletal: -joint pain, -muscle pain  Skin: -rash, -lesion  Neurological: -headache, -dizziness, -numbness, -tingling, +confusion or disorientation, +forgetfulness  Psychiatric: -anxiety, -depression, -sleep difficulty         Review of Systems     Objective:   LMP 06/01/2025     Physical Exam  Pulmonary:      Effort: Pulmonary effort is normal.   Neurological:      Mental Status: She is alert.              Assessment        ICD-10-CM ICD-9-CM   1. Microcytic anemia  D50.9 280.9   2. Fatigue, unspecified type  R53.83 780.79   3. Brain fog  R41.89 799.59         Plan:   Assessment & Plan               1. Microcytic anemia  -     Thalasseima and Hemoglobinopathy Eval; Future; Expected date: 06/12/2025  -     Copper, Serum; Future; Expected date: 06/12/2025  -     Zinc; Future; Expected date: 06/12/2025  -     Cortisol, 8AM; Future; Expected date: 06/12/2025  -     Lactate Dehydrogenase; Future; Expected date: 06/12/2025  -     Reticulocytes; Future; Expected date: 06/12/2025  -     Heavy Metals Screen, Blood (Quantitative); Future; Expected date: 06/12/2025    2. Fatigue, unspecified type  -     Copper, Serum; Future; Expected date: 06/12/2025  -     Zinc; Future; Expected date: 06/12/2025  -     Cortisol, 8AM; Future; Expected date: 06/12/2025  -     Heavy Metals Screen, Blood (Quantitative); Future; Expected date: 06/12/2025    3. Brain fog  -     Copper, Serum; Future; Expected date: 06/12/2025  -     Zinc; Future; Expected date: 06/12/2025  -     Cortisol, 8AM; Future; Expected date: 06/12/2025  -     Heavy Metals Screen, Blood (Quantitative); Future; Expected date: 06/12/2025     Check ordered labs for complete evaluation.        -Jordan Díaz Jr., MD, AAHIVS      This note  was generated with the assistance of ambient listening technology. Verbal consent was obtained by the patient and accompanying visitor(s) for the recording of patient appointment to facilitate this note. I attest to having reviewed and edited the generated note for accuracy, though some syntax or spelling errors may persist. Please contact the author of this note for any clarification.      There are no Patient Instructions on file for this visit.      No follow-ups on file.   Future Appointments   Date Time Provider Department Center   7/11/2025  9:00 AM Althea Torres MD [facility]

## 2025-06-13 ENCOUNTER — LAB VISIT (OUTPATIENT)
Dept: LAB | Facility: HOSPITAL | Age: 25
End: 2025-06-13
Attending: FAMILY MEDICINE
Payer: COMMERCIAL

## 2025-06-13 DIAGNOSIS — R41.89 BRAIN FOG: ICD-10-CM

## 2025-06-13 DIAGNOSIS — D50.9 MICROCYTIC ANEMIA: ICD-10-CM

## 2025-06-13 DIAGNOSIS — R53.83 FATIGUE, UNSPECIFIED TYPE: ICD-10-CM

## 2025-06-13 LAB
CORTIS SERPL-MCNC: 14.4 UG/DL (ref 4.3–22.4)
LDH SERPL-CCNC: 136 U/L (ref 110–260)
RETICS/RBC NFR AUTO: 2 % (ref 0.5–2.5)

## 2025-06-13 PROCEDURE — 83789 MASS SPECTROMETRY QUAL/QUAN: CPT

## 2025-06-13 PROCEDURE — 82525 ASSAY OF COPPER: CPT

## 2025-06-13 PROCEDURE — 82300 ASSAY OF CADMIUM: CPT

## 2025-06-13 PROCEDURE — 82533 TOTAL CORTISOL: CPT

## 2025-06-13 PROCEDURE — 83020 HEMOGLOBIN ELECTROPHORESIS: CPT

## 2025-06-13 PROCEDURE — 85045 AUTOMATED RETICULOCYTE COUNT: CPT

## 2025-06-13 PROCEDURE — 83615 LACTATE (LD) (LDH) ENZYME: CPT

## 2025-06-13 PROCEDURE — 84630 ASSAY OF ZINC: CPT

## 2025-06-13 PROCEDURE — 36415 COLL VENOUS BLD VENIPUNCTURE: CPT

## 2025-06-14 LAB
ADDRESS: NORMAL
ARSENIC BLD-MCNC: 4 NG/ML
ATTENDING PHYSICIAN NAME: NORMAL
CADMIUM BLD-MCNC: <0.2 NG/ML
COUNTY OF RESIDENCE: NORMAL
EMPLOYER NAME: NORMAL
FACILITY PHONE #: NORMAL
HX OF OCCUPATION: NORMAL
LEAD BLDV-MCNC: <1 MCG/DL
M HEALTH CARE PROVIDER PHONE: NORMAL
M PATIENT CITY: NORMAL
MERCURY BLD-MCNC: 2 NG/ML
PHONE #: NORMAL
PROVIDER CITY: NORMAL
PROVIDER POSTAL CODE: NORMAL
PROVIDER STATE: NORMAL
REFER PHYSICIAN ADDR: NORMAL
SPECIMEN SOURCE: NORMAL

## 2025-06-16 LAB — W ZINC: 90 UG/DL

## 2025-06-18 LAB
FERRITIN SERPL IA-MCNC: 189 MCG/L (ref 6–175)
HGB A MFR BLD ELPH: 94.6 % (ref 95.8–98)
HGB A2 MFR BLD ELPH: 5.4 % (ref 2–3.3)
HGB F MFR BLD ELPH: 0 % (ref 0–0.9)
HGB FRACT BLD ELPH-IMP: ABNORMAL
HGB OTHER BLD: NORMAL
M HPLC HB VARIANT, B: ABNORMAL
PROVIDER SIGNING NAME: ABNORMAL

## 2025-06-19 LAB — W COPPER: 2350 UG/L

## 2025-06-27 ENCOUNTER — RESULTS FOLLOW-UP (OUTPATIENT)
Dept: FAMILY MEDICINE | Facility: CLINIC | Age: 25
End: 2025-06-27

## 2025-06-27 DIAGNOSIS — R79.0 HIGH SERUM COPPER LEVEL: ICD-10-CM

## 2025-06-27 DIAGNOSIS — D56.3 BETA THALASSEMIA MINOR: Primary | ICD-10-CM

## 2025-07-11 ENCOUNTER — OFFICE VISIT (OUTPATIENT)
Dept: HEMATOLOGY/ONCOLOGY | Facility: CLINIC | Age: 25
End: 2025-07-11
Payer: COMMERCIAL

## 2025-07-11 ENCOUNTER — TELEPHONE (OUTPATIENT)
Dept: NEUROLOGY | Facility: CLINIC | Age: 25
End: 2025-07-11
Payer: COMMERCIAL

## 2025-07-11 VITALS
HEIGHT: 64 IN | HEART RATE: 60 BPM | OXYGEN SATURATION: 99 % | BODY MASS INDEX: 24.1 KG/M2 | SYSTOLIC BLOOD PRESSURE: 130 MMHG | TEMPERATURE: 98 F | DIASTOLIC BLOOD PRESSURE: 86 MMHG | WEIGHT: 141.13 LBS

## 2025-07-11 DIAGNOSIS — D56.3 BETA THALASSEMIA MINOR: Primary | ICD-10-CM

## 2025-07-11 DIAGNOSIS — R79.0 HIGH SERUM COPPER LEVEL: ICD-10-CM

## 2025-07-11 DIAGNOSIS — G43.909 MIGRAINE WITHOUT STATUS MIGRAINOSUS, NOT INTRACTABLE, UNSPECIFIED MIGRAINE TYPE: ICD-10-CM

## 2025-07-11 DIAGNOSIS — Z76.89 ENCOUNTER TO ESTABLISH CARE: ICD-10-CM

## 2025-07-11 PROCEDURE — 99999 PR PBB SHADOW E&M-EST. PATIENT-LVL IV: CPT | Mod: PBBFAC,,, | Performed by: STUDENT IN AN ORGANIZED HEALTH CARE EDUCATION/TRAINING PROGRAM

## 2025-07-11 NOTE — PROGRESS NOTES
Hematology- Oncology Clinic Note :     7/11/2025    Chief Complaint   Patient presents with    Establish Care    Fatigue    Anemia    migraines         HPI  Pt is a 25 y.o. female with hx of B -thal minor who presents for follow up of B thal minor and elevated copper level.  She has beta thalassemia trait and migraines.  Since pregnancy and starting OCPs pt has had worsening fatigue and migraines.  Labs and workup reviewed with pt and largely wnl for B thal minor.  Copper only slightly elevated and will check again later this year.  Pt denies supplement or OTC med use.  Only complaints at time of exam are fatigue and migraines.  Neurology referral placed.  All questions answered to her satisfaction.            Active Problem List with Overview Notes    Diagnosis Date Noted    Inattention 10/15/2021       Patient Active Problem List    Diagnosis Date Noted    Inattention 10/15/2021     Past Medical History:   Diagnosis Date    UTI (urinary tract infection)       History reviewed. No pertinent surgical history.   Prescriptions Prior to Admission[1]  Review of patient's allergies indicates:  No Known Allergies   Social History     Tobacco Use    Smoking status: Never    Smokeless tobacco: Never   Substance Use Topics    Alcohol use: Yes     Comment: social      Family History   Problem Relation Name Age of Onset    Hypertension Father      Learning disabilities Father          Review of Systems :  Review of Systems   Constitutional:  Positive for malaise/fatigue. Negative for chills and fever.   HENT:  Negative for congestion, hearing loss and nosebleeds.    Eyes:  Negative for blurred vision and photophobia.   Respiratory:  Negative for cough and shortness of breath.    Cardiovascular:  Negative for chest pain and leg swelling.   Gastrointestinal:  Negative for abdominal pain, blood in stool, constipation, diarrhea, heartburn, melena, nausea and vomiting.   Genitourinary:  Negative for dysuria.   Musculoskeletal:   Negative for joint pain and myalgias.   Skin:  Negative for itching and rash.   Neurological:  Positive for headaches. Negative for tingling, tremors, sensory change, speech change, focal weakness, seizures and weakness.   Endo/Heme/Allergies:  Does not bruise/bleed easily.   Psychiatric/Behavioral:  Negative for depression. The patient is not nervous/anxious.        Physical Exam :  Wt Readings from Last 3 Encounters:   07/11/25 64 kg (141 lb 1.5 oz)   06/05/25 64.7 kg (142 lb 10.2 oz)   04/30/25 62.6 kg (138 lb)     Temp Readings from Last 3 Encounters:   07/11/25 98.4 °F (36.9 °C) (Oral)   06/05/25 98 °F (36.7 °C) (Oral)   07/17/23 98.6 °F (37 °C) (Oral)     BP Readings from Last 3 Encounters:   07/11/25 130/86   06/05/25 102/74   04/30/25 112/80     Pulse Readings from Last 3 Encounters:   07/11/25 60   06/05/25 80   07/17/23 90     Body mass index is 24.22 kg/m².    Physical Exam  Vitals reviewed.   Constitutional:       Appearance: Normal appearance.   HENT:      Head: Normocephalic and atraumatic.      Nose: Nose normal.      Mouth/Throat:      Mouth: Mucous membranes are moist.   Eyes:      Pupils: Pupils are equal, round, and reactive to light.   Cardiovascular:      Rate and Rhythm: Normal rate and regular rhythm.   Pulmonary:      Effort: Pulmonary effort is normal.   Abdominal:      General: Abdomen is flat.   Musculoskeletal:         General: Normal range of motion.      Cervical back: Normal range of motion and neck supple.   Skin:     General: Skin is warm and dry.   Neurological:      Mental Status: She is alert. Mental status is at baseline.   Psychiatric:         Mood and Affect: Mood normal.         Behavior: Behavior normal.           Pertinent Diagnostic studies:    No results found for this or any previous visit (from the past 24 hours).    Assessment/Plan :     Beta Thalassemia minor/elevated copper  -CBC stable and wnl for B thal minor  -Pt's mother had B thal  -copper level only mildly  elevated  -Literature given on B thal and suggested daily folic acid  -RTC in 4 months for MD visit with Dr. Koch and repeat copper and CBC       Fatigue/migraines  -chronic but worsened after pregnancy and starting OCPs  -Denies RONDA symptoms  -Referral placed to neurology (Dr. Butler)       Time spent on case: 60 minutes     Summary of orders placed this encounter:  Orders Placed This Encounter   Procedures    CBC Auto Differential    Copper, Serum    CBC Auto Differential    Ferritin    Iron and TIBC    Copper, Serum    Folate    MMA    Ambulatory referral/consult to Neurology       Future Appointments   Date Time Provider Department Center   11/3/2025  8:20 AM LAB, [facility] LAB [facility] [place]   11/11/2025 11:00 AM Elisha Koch MD [facility] HEM ONC [facility] [place] Aleah Torres MD   Hematology/oncology, [facility]           [1] (Not in a hospital admission)

## 2025-07-11 NOTE — Clinical Note
-please get neurology apt with Dr. Bolivar Butler -RTC in 4 months for MD visit with Dr. Koch and all the labs I ordered 1 week prior

## 2025-07-11 NOTE — TELEPHONE ENCOUNTER
----- Message from Med Assistant Dong sent at 7/11/2025  9:27 AM CDT -----  Good Morning,     Dr. Torres has placed a referral for this patient to see neurology. Can you please assist her with scheduling?  ----- Message -----  From: Althea Torres MD  Sent: 7/11/2025   9:23 AM CDT  To: [facility] Hem Onc Clinical Staff    -please get neurology apt with Dr. Bolivar Butler  -[facility] in 4 months for MD visit with Dr. Koch and all the labs I ordered 1 week prior